# Patient Record
Sex: FEMALE | Race: WHITE | NOT HISPANIC OR LATINO | Employment: FULL TIME | URBAN - METROPOLITAN AREA
[De-identification: names, ages, dates, MRNs, and addresses within clinical notes are randomized per-mention and may not be internally consistent; named-entity substitution may affect disease eponyms.]

---

## 2017-03-20 ENCOUNTER — HOSPITAL ENCOUNTER (EMERGENCY)
Facility: CLINIC | Age: 37
Discharge: HOME | End: 2017-03-20
Attending: EMERGENCY MEDICINE | Admitting: EMERGENCY MEDICINE
Payer: MEDICAID

## 2017-03-20 ENCOUNTER — TELEPHONE (OUTPATIENT)
Dept: BEHAVIORAL HEALTH | Facility: CLINIC | Age: 37
End: 2017-03-20

## 2017-03-20 VITALS
HEART RATE: 100 BPM | OXYGEN SATURATION: 98 % | TEMPERATURE: 98 F | SYSTOLIC BLOOD PRESSURE: 136 MMHG | WEIGHT: 150.2 LBS | DIASTOLIC BLOOD PRESSURE: 90 MMHG | RESPIRATION RATE: 16 BRPM

## 2017-03-20 DIAGNOSIS — F10.220 ACUTE ALCOHOLIC INTOXICATION IN ALCOHOLISM, UNCOMPLICATED (H): ICD-10-CM

## 2017-03-20 LAB
ALBUMIN SERPL-MCNC: 3.8 G/DL (ref 3.4–5)
ALCOHOL BREATH TEST: 0.3 (ref 0–0.01)
ALP SERPL-CCNC: 81 U/L (ref 40–150)
ALT SERPL W P-5'-P-CCNC: 50 U/L (ref 0–50)
AMPHETAMINES UR QL SCN: ABNORMAL
ANION GAP SERPL CALCULATED.3IONS-SCNC: 12 MMOL/L (ref 3–14)
AST SERPL W P-5'-P-CCNC: 63 U/L (ref 0–45)
BARBITURATES UR QL: ABNORMAL
BASOPHILS # BLD AUTO: 0 10E9/L (ref 0–0.2)
BASOPHILS NFR BLD AUTO: 0.7 %
BENZODIAZ UR QL: ABNORMAL
BILIRUB SERPL-MCNC: 0.4 MG/DL (ref 0.2–1.3)
BUN SERPL-MCNC: 7 MG/DL (ref 7–30)
CALCIUM SERPL-MCNC: 8.5 MG/DL (ref 8.5–10.1)
CANNABINOIDS UR QL SCN: ABNORMAL
CHLORIDE SERPL-SCNC: 109 MMOL/L (ref 94–109)
CO2 SERPL-SCNC: 25 MMOL/L (ref 20–32)
COCAINE UR QL: ABNORMAL
CREAT SERPL-MCNC: 0.54 MG/DL (ref 0.52–1.04)
DIFFERENTIAL METHOD BLD: ABNORMAL
EOSINOPHIL # BLD AUTO: 0.1 10E9/L (ref 0–0.7)
EOSINOPHIL NFR BLD AUTO: 1.3 %
ERYTHROCYTE [DISTWIDTH] IN BLOOD BY AUTOMATED COUNT: 14.4 % (ref 10–15)
ETHANOL UR QL SCN: ABNORMAL
GFR SERPL CREATININE-BSD FRML MDRD: ABNORMAL ML/MIN/1.7M2
GLUCOSE SERPL-MCNC: 74 MG/DL (ref 70–99)
HCT VFR BLD AUTO: 41.1 % (ref 35–47)
HGB BLD-MCNC: 14.3 G/DL (ref 11.7–15.7)
IMM GRANULOCYTES # BLD: 0 10E9/L (ref 0–0.4)
IMM GRANULOCYTES NFR BLD: 0.2 %
LIPASE SERPL-CCNC: 160 U/L (ref 73–393)
LYMPHOCYTES # BLD AUTO: 2.7 10E9/L (ref 0.8–5.3)
LYMPHOCYTES NFR BLD AUTO: 45.1 %
MCH RBC QN AUTO: 34.8 PG (ref 26.5–33)
MCHC RBC AUTO-ENTMCNC: 34.8 G/DL (ref 31.5–36.5)
MCV RBC AUTO: 100 FL (ref 78–100)
MONOCYTES # BLD AUTO: 0.3 10E9/L (ref 0–1.3)
MONOCYTES NFR BLD AUTO: 5 %
NEUTROPHILS # BLD AUTO: 2.9 10E9/L (ref 1.6–8.3)
NEUTROPHILS NFR BLD AUTO: 47.7 %
NRBC # BLD AUTO: 0 10*3/UL
NRBC BLD AUTO-RTO: 0 /100
OPIATES UR QL SCN: ABNORMAL
PLATELET # BLD AUTO: 278 10E9/L (ref 150–450)
POTASSIUM SERPL-SCNC: 3.3 MMOL/L (ref 3.4–5.3)
PROT SERPL-MCNC: 6.9 G/DL (ref 6.8–8.8)
RBC # BLD AUTO: 4.11 10E12/L (ref 3.8–5.2)
SODIUM SERPL-SCNC: 146 MMOL/L (ref 133–144)
WBC # BLD AUTO: 6.1 10E9/L (ref 4–11)

## 2017-03-20 PROCEDURE — 99285 EMERGENCY DEPT VISIT HI MDM: CPT | Performed by: EMERGENCY MEDICINE

## 2017-03-20 PROCEDURE — 82075 ASSAY OF BREATH ETHANOL: CPT | Performed by: EMERGENCY MEDICINE

## 2017-03-20 PROCEDURE — 99284 EMERGENCY DEPT VISIT MOD MDM: CPT | Mod: Z6 | Performed by: EMERGENCY MEDICINE

## 2017-03-20 PROCEDURE — 80053 COMPREHEN METABOLIC PANEL: CPT | Performed by: EMERGENCY MEDICINE

## 2017-03-20 PROCEDURE — 83690 ASSAY OF LIPASE: CPT | Performed by: EMERGENCY MEDICINE

## 2017-03-20 PROCEDURE — 80320 DRUG SCREEN QUANTALCOHOLS: CPT | Mod: 91 | Performed by: EMERGENCY MEDICINE

## 2017-03-20 PROCEDURE — 85025 COMPLETE CBC W/AUTO DIFF WBC: CPT | Performed by: EMERGENCY MEDICINE

## 2017-03-20 PROCEDURE — 80307 DRUG TEST PRSMV CHEM ANLYZR: CPT | Performed by: EMERGENCY MEDICINE

## 2017-03-20 PROCEDURE — 81025 URINE PREGNANCY TEST: CPT | Performed by: EMERGENCY MEDICINE

## 2017-03-20 RX ORDER — LIDOCAINE 40 MG/G
CREAM TOPICAL
Status: DISCONTINUED | OUTPATIENT
Start: 2017-03-20 | End: 2017-03-20 | Stop reason: HOSPADM

## 2017-03-20 ASSESSMENT — ENCOUNTER SYMPTOMS
ABDOMINAL PAIN: 1
NEUROLOGICAL NEGATIVE: 1
VOMITING: 1

## 2017-03-20 NOTE — ED AVS SNAPSHOT
[facility], Emergency Department    [address]    [place] [address]    Phone:  [phone]    Fax:  [phone]                                       Gloria Benjamin   MRN: [id number]    Department:  [facility], Emergency Department   Date of Visit:  3/20/2017           Patient Information     Date Of Birth          1980        Your diagnoses for this visit were:     Acute alcoholic intoxication in alcoholism, uncomplicated (H)        You were seen by Desi Myers MD.        Discharge Instructions       Thank you for coming to the [facility] Emergency Department.     Please go directly to [facility] to start detox today.     Abstain from alcohol and marijuana.    If stomach discomfort returns, try over the counter ranitidine or prilosec antacids. Stopping alcohol use will help gastritis or peptic ulcers improve. Stopping marijuana use will prevent recurrent vomiting called cannabinoid hyperemesis syndrome.        24 Hour Appointment Hotline       To make an appointment at any [facility] clinic, call [phone] ([phone]). If you don't have a family doctor or clinic, we will help you find one. [facility] clinics are conveniently located to serve the needs of you and your family.             Review of your medicines      Our records show that you are taking the medicines listed below. If these are incorrect, please call your family doctor or clinic.        Dose / Directions Last dose taken    IMITREX PO        Take by mouth every 8 hours as needed for migraine   Refills:  0                Procedures and tests performed during your visit     Alcohol breath test POCT    CBC with platelets differential    Comprehensive metabolic panel    Drug abuse screen 6 urine (tox)    HCG qualitative urine    Lipase    Peripheral IV catheter      Orders Needing Specimen Collection     None      Pending Results     No orders found from 3/18/2017 to 3/21/2017.        "     Pending Culture Results     No orders found from 3/18/2017 to 3/21/2017.            Thank you for choosing [facility]       Thank you for choosing [facility] for your care. Our goal is always to provide you with excellent care. Hearing back from our patients is one way we can continue to improve our services. Please take a few minutes to complete the written survey that you may receive in the mail after you visit with us. Thank you!        [employer] Information     [employer] lets you send messages to your doctor, view your test results, renew your prescriptions, schedule appointments and more. To sign up, go to [email or web address] . Click on \"Log in\" on the left side of the screen, which will take you to the Welcome page. Then click on \"Sign up Now\" on the right side of the page.     You will be asked to enter the access code listed below, as well as some personal information. Please follow the directions to create your username and password.     Your access code is: [email or web address]  Expires: 2017  4:13 PM     Your access code will  in 90 days. If you need help or a new code, please call your [facility] clinic or [phone].        Care EveryWhere ID     This is your Care EveryWhere ID. This could be used by other organizations to access your [facility] medical records  [id number]        After Visit Summary       This is your record. Keep this with you and show to your community pharmacist(s) and doctor(s) at your next visit.                  "

## 2017-03-20 NOTE — ED AVS SNAPSHOT
[facility], [place], Emergency Department    [address]    [facility] [address]    Phone:  [phone]    Fax:  [phone]                                       Gloria Benjamin   MRN: [id number]    Department:  [facility], Emergency Department   Date of Visit:  3/20/2017           After Visit Summary Signature Page     I have received my discharge instructions, and my questions have been answered. I have discussed any challenges I see with this plan with the nurse or doctor.    ..........................................................................................................................................  Patient/Patient Representative Signature      ..........................................................................................................................................  Patient Representative Print Name and Relationship to Patient    ..................................................               ................................................  Date                                            Time    ..........................................................................................................................................  Reviewed by Signature/Title    ...................................................              ..............................................  Date                                                            Time

## 2017-03-20 NOTE — ED PROVIDER NOTES
History     Chief Complaint   Patient presents with     Addiction Problem     HPI  Gloria Benjamin is a 36-year-old female here seeking detox from alcohol. She reports a long history of alcohol use.  She started when she was a teenager and works as a .  She does drink daily and most recently has been taking approximately a half liter of Raymond whiskey daily, her last drink was approximately one hour prior to coming here to the hospital.  On arrival her breathalyzer is 0.298.  In addition to alcohol she does use marijuana daily.  She uses no other drugs.  She has never entered detox or done any other treatment programs in the past, but is currently working to enter chemical dependency treatment at the [facility] in [place] but is required to go through supervised detox prior. She does report a history of withdrawal symptoms of tremulousness, slight agitation when not drinking but no history of DTs or seizures.    She has a history of an allergy to penicillin and she s also had an appendectomy, otherwise she is healthy.  She has been seen in hospitals and emergency departments in the past several months with issues with nausea and vomiting, the thought was that she might have cannabinoid hyperemesis versus alcoholic gastritis or peptic ulcer disease.  She has noted blood in her emesis in the past but not recently. No melena.  She reports a past history of depression and has been through therapy, but is not currently working with a therapist or psychiatrist, and is not medicated.  No current suicidal ideation.  She does have a history of suicidal threats that were made when she was a 14-year-old child and was admitted to a mental health unit for approximately one week for treatment at that time.  She presents here with her mom who has been actively working over the weekend to try to find a detox bed for her.    This part of the medical record was transcribed by Milena May Medical Scribe, from a dictation  done by Desi Myers MD.     PAST MEDICAL HISTORY  Past Medical History   Diagnosis Date     Substance abuse      PAST SURGICAL HISTORY  Past Surgical History   Procedure Laterality Date     Appendectomy       Gyn surgery       Ovarian cyst removed     FAMILY HISTORY  No family history on file.  SOCIAL HISTORY  Social History   Substance Use Topics     Smoking status: Current Every Day Smoker     Packs/day: 1.00     Smokeless tobacco: Never Used     Alcohol use Yes      Comment: 1/2 liter day     MEDICATIONS  Current Facility-Administered Medications   Medication     lidocaine 1 % 1 mL     lidocaine (LMX4) kit     sodium chloride (PF) 0.9% PF flush 3 mL     sodium chloride (PF) 0.9% PF flush 3 mL     Current Outpatient Prescriptions   Medication     SUMAtriptan Succinate (IMITREX PO)     ALLERGIES  Allergies   Allergen Reactions     Penicillin G Hives       I have reviewed the Medications, Allergies, Past Medical and Surgical History, and Social History in the Epic system.    Review of Systems   Gastrointestinal: Positive for abdominal pain and vomiting.   Neurological: Negative.    All other systems reviewed and are negative.      Physical Exam   BP: (!) 130/94  Heart Rate: 78  Temp: 98  F (36.7  C)  Resp: 16  Weight: 68.1 kg (150 lb 3.2 oz)  SpO2: 98 %    Physical Exam  Gen:A&Ox3, no acute distress, intoxicated but cooperative.   HEENT:PERRL, no facial tenderness or wounds, head atraumatic, oropharynx clear, mucous membranes moist, TMs clear bilaterally  Neck:no bony tenderness or step offs, no JVD, trachea midline  Back: no CVA tenderness  CV:RRR without murmurs  PULM:Clear to auscultation bilaterally  Abd: soft, minimal epigastric tenderness, no guarding or rebound tenderness   UE:No traumatic injuries, skin normal  LE:no traumatic injuries, skin normal, no LE edema.   Neuro:CN II-XII intact, strength 5/5 throughout, gait stable.   Skin: no rashes or ecchymoses      ED Course     ED Course     Procedures              Critical Care time:  none               Labs Ordered and Resulted from Time of ED Arrival Up to the Time of Departure from the ED   CBC WITH PLATELETS DIFFERENTIAL - Abnormal; Notable for the following:        Result Value    MCH 34.8 (*)     All other components within normal limits   COMPREHENSIVE METABOLIC PANEL - Abnormal; Notable for the following:     Sodium 146 (*)     Potassium 3.3 (*)     AST 63 (*)     All other components within normal limits   DRUG ABUSE SCREEN 6 CHEM DEP URINE ([facility]) - Abnormal; Notable for the following:     Cannabinoids Qual Urine   (*)     Value: Positive   Cutoff for a positive cannabinoid is greater than 50 ng/mL. This is an   unconfirmed screening result to be used for medical purposes only.      Ethanol Qual Urine   (*)     Value: Positive   Cutoff for a positive urine ethanol is greater than 0.05 g/dL.  This is an   unconfirmed screening result to be used for medical purposes only.      All other components within normal limits   ALCOHOL BREATH TEST POCT - Abnormal; Notable for the following:     Alcohol Breath Test 0.298 (*)     All other components within normal limits   LIPASE   HCG QUALITATIVE URINE   PERIPHERAL IV CATHETER       Assessments & Plan (with Medical Decision Making)   36-year-old female presenting with alcoholism.  She is intoxicated with a breathalyzer of 0.298 but is awake, alert and able to communicate.  She is interested in detox. I was able to speak with our detox intake, they are not certain that we will be able to have a bed for her today.  Vitals here are within normal limits.  Laboratory testing was done including CBC, comprehensive metabolic panel and lipase, these were notable for a slight increase in sodium to 146, potassium of 3.3, and minimal elevation of AST at 63. Her case was discussed with nursing staff at [facility] and they will have a bed available for her after 4:00pm today. The patient and her Mom are agreeable to transfer there  but would prefer to go by private car.       bHCG negative.   USD + for cannabinoids and alcohol, no other substances, consistent with her reported history.     I have reviewed the nursing notes.    I have reviewed the findings, diagnosis, plan and need for follow up with the patient.    New Prescriptions    No medications on file       Final diagnoses:   Acute alcoholic intoxication in alcoholism, uncomplicated (H)       3/20/2017   [facility], [place], EMERGENCY DEPARTMENT    MD OLAF Duke Katrina Anne, MD  03/20/17 [id number]

## 2017-03-20 NOTE — DISCHARGE INSTRUCTIONS
Thank you for coming to the [facility] Emergency Department.     Please go directly to [facility] to start detox today.     Abstain from alcohol and marijuana.    If stomach discomfort returns, try over the counter ranitidine or prilosec antacids. Stopping alcohol use will help gastritis or peptic ulcers improve. Stopping marijuana use will prevent recurrent vomiting called cannabinoid hyperemesis syndrome.

## 2017-03-21 LAB — HCG UR QL: NEGATIVE

## 2017-05-31 ENCOUNTER — RESULT FOLLOW UP (OUTPATIENT)
Dept: OBGYN | Facility: CLINIC | Age: 37
End: 2017-05-31

## 2017-05-31 ENCOUNTER — OFFICE VISIT (OUTPATIENT)
Dept: OBGYN | Facility: CLINIC | Age: 37
End: 2017-05-31
Payer: COMMERCIAL

## 2017-05-31 VITALS
HEIGHT: 63 IN | SYSTOLIC BLOOD PRESSURE: 118 MMHG | HEART RATE: 89 BPM | BODY MASS INDEX: 27.64 KG/M2 | DIASTOLIC BLOOD PRESSURE: 74 MMHG | WEIGHT: 156 LBS | OXYGEN SATURATION: 99 %

## 2017-05-31 DIAGNOSIS — R87.619 ABNORMAL PAP SMEAR OF CERVIX: ICD-10-CM

## 2017-05-31 DIAGNOSIS — Z01.419 ENCOUNTER FOR GYNECOLOGICAL EXAMINATION WITHOUT ABNORMAL FINDING: Primary | ICD-10-CM

## 2017-05-31 PROCEDURE — 87624 HPV HI-RISK TYP POOLED RSLT: CPT | Performed by: OBSTETRICS & GYNECOLOGY

## 2017-05-31 PROCEDURE — 99385 PREV VISIT NEW AGE 18-39: CPT | Performed by: OBSTETRICS & GYNECOLOGY

## 2017-05-31 PROCEDURE — G0145 SCR C/V CYTO,THINLAYER,RESCR: HCPCS | Performed by: OBSTETRICS & GYNECOLOGY

## 2017-05-31 NOTE — LETTER
May 18, 2018      Gloria Benjamin  [address] [address]    Dear ,      This letter is to remind you that you are due for your follow up PAP smear and HPV test on or about 5/31/18.    Please call [phone] to schedule your appointment at your earliest convenience.     If you have completed the tests outside of [facility], please have the results forwarded to our office. We will update the chart for your primary Physician to review before your next annual physical.     Sincerely,      Chelsi Balbuena MD/[facility]

## 2017-05-31 NOTE — MR AVS SNAPSHOT
"              After Visit Summary   2017    Gloria Benjamin    MRN: [id number]           Patient Information     Date Of Birth          1980        Visit Information        Provider Department      2017 1:30 PM Chelsi Balbuena MD [facility]        Today's Diagnoses     Encounter for gynecological examination without abnormal finding    -  1       Follow-ups after your visit        Who to contact     If you have questions or need follow up information about today's clinic visit or your schedule please contact [facility]note 2] directly at [phone].  Normal or non-critical lab and imaging results will be communicated to you by [employer]ix[employer], letter or phone within 4 business days after the clinic has received the results. If you do not hear from us within 7 days, please contact the clinic through [employer] or phone. If you have a critical or abnormal lab result, we will notify you by phone as soon as possible.  Submit refill requests through S[employer]r or call your pharmacy and they will forward the refill request to us. Please allow 3 business days for your refill to be completed.          Additional Information About Your Visit        MyChart Information     [employer] lets you send messages to your doctor, view your test results, renew your prescriptions, schedule appointments and more. To sign up, go to [email or web address] . Click on \"Log in\" on the left side of the screen, which will take you to the Welcome page. Then click on \"Sign up Now\" on the right side of the page.     You will be asked to enter the access code listed below, as well as some personal information. Please follow the directions to create your username and password.     Your access code is: [email or web address]  Expires: 2017  4:13 PM     Your access code will  in 90 days. If you need help or a new code, please call your [facility] or [phone].        Care EveryWhere ID     This is your Care EveryWhere " "ID. This could be used by other organizations to access your [facility] medical records  [id number]        Your Vitals Were     Pulse Height Last Period Pulse Oximetry Breastfeeding? BMI (Body Mass Index)    89 5' 3\" (1.6 m) 05/27/2017 99% No 27.63 kg/m2       Blood Pressure from Last 3 Encounters:   05/31/17 118/74   03/20/17 136/90    Weight from Last 3 Encounters:   05/31/17 156 lb (70.8 kg)   03/20/17 150 lb 3.2 oz (68.1 kg)              We Performed the Following     HPV High Risk Types DNA Cervical     Pap imaged thin layer screen with HPV - recommended age 30 - 65 years (select HPV order below)        Primary Care Provider    Physician No Ref-Primary       No address on file        Thank you!     Thank you for choosing [facility]  for your care. Our goal is always to provide you with excellent care. Hearing back from our patients is one way we can continue to improve our services. Please take a few minutes to complete the written survey that you may receive in the mail after your visit with us. Thank you!             Your Updated Medication List - Protect others around you: Learn how to safely use, store and throw away your medicines at www.disposemymeds.org.          This list is accurate as of: 5/31/17  2:10 PM.  Always use your most recent med list.                   Brand Name Dispense Instructions for use    IMITREX PO      Take by mouth every 8 hours as needed for migraine         "

## 2017-05-31 NOTE — NURSING NOTE
"Chief Complaint   Patient presents with     Physical       Initial /74  Pulse 89  Ht 5' 3\" (1.6 m)  Wt 156 lb (70.8 kg)  LMP 2017  SpO2 99%  Breastfeeding? No  BMI 27.63 kg/m2 Estimated body mass index is 27.63 kg/(m^2) as calculated from the following:    Height as of this encounter: 5' 3\" (1.6 m).    Weight as of this encounter: 156 lb (70.8 kg).  BP completed using cuff size: regular        The following HM Due: Pap smear    The following patient reported/Care Every where data was sent to:  P ABSTRACT QUALITY INITIATIVES [45720]  none     patient has appointment for today             "

## 2017-05-31 NOTE — PROGRESS NOTES
Gloria is a 36 year old  female who presents for annual exam.   Had laparotomy with right ovarian cystectomy about 5 years ago, reports extensive endometriosis found, used Lupron x one year (found it a very unpleasant medication, would not be interested in any hormonal options).  Has regular menses, heavy and painful, but feels she can deal with the situation at this time.  Last pap several years ago, had an abnormal pap in her teens.  Will send pap/HPV test today, if both are negative will plan to co-test in one year.  Declines STD testing.  Recently hospitalized for alcohol abuse.  Smokes cigarettes, not a candidate for estrogen-containing contraception.      Menses are irregular and normal and painful lasting 4-7 days.  Menses flow: heavy.  Patient's last menstrual period was 2017.. Using none for contraception.  She is not currently considering pregnancy.  Besides routine health maintenance, she has no other health concerns today .  GYNECOLOGIC HISTORY:  Menarche: 11    Gloria is sexually active with 1male partner(s) and is currently in monogamous relationship with boyfriend.    History sexually transmitted infections:No STD history  STI testing offered?  Declined  CHITRA exposure: No  History of abnormal Pap smear: YES - updated in Problem List and Health Maintenance accordingly  Family history of breast CA: No  Family history of uterine/ovarian CA: No    Family history of colon CA: No    HEALTH MAINTENANCE:  Cholesterol: (No results found for: CHOL History of abnormal lipids: No  Mammo: 0 . History of abnormal Mammo: Not applicable.  Regular Self Breast Exams: No  Calcium/Vitamin D intake: source:  dairy, dietary supplement(s) Adequate? Yes  TSH: (No results found for: TSH )  Pap; (No results found for: PAP )    HISTORY:  Obstetric History       T0      L0     SAB0   TAB0   Ectopic0   Multiple0   Live Births0       # Outcome Date GA Lbr Foreign/2nd Weight Sex Delivery Anes PTL Lv   2 AB      "       1 AB                 Past Medical History:   Diagnosis Date     Substance abuse      Past Surgical History:   Procedure Laterality Date     APPENDECTOMY       GYN SURGERY      Ovarian cyst removed     No family history on file.  Social History     Social History     Marital status: Single     Spouse name: N/A     Number of children: N/A     Years of education: N/A     Social History Main Topics     Smoking status: Current Every Day Smoker     Packs/day: 1.00     Smokeless tobacco: Never Used     Alcohol use No     Drug use: No      Comment: Occasional     Sexual activity: Yes     Partners: Male     Birth control/ protection: Condom     Other Topics Concern     None     Social History Narrative       Current Outpatient Prescriptions:      SUMAtriptan Succinate (IMITREX PO), Take by mouth every 8 hours as needed for migraine, Disp: , Rfl:      Allergies   Allergen Reactions     Penicillin G Hives       Past medical, surgical, social and family history were reviewed and updated in EPIC.    ROS:   C:     NEGATIVE for fever, chills, change in weight  I:       NEGATIVE for worrisome rashes, moles or lesions  E:     NEGATIVE for vision changes or irritation  E/M: NEGATIVE for ear, mouth and throat problems  R:     NEGATIVE for significant cough or SOB  CV:   NEGATIVE for chest pain, palpitations or peripheral edema  GI:     NEGATIVE for nausea, abdominal pain, heartburn, or change in bowel habits  :   NEGATIVE for frequency, dysuria, hematuria, vaginal discharge, or irregular bleeding  M:     NEGATIVE for significant arthralgias or myalgia  N:      NEGATIVE for weakness, dizziness or paresthesias  E:      NEGATIVE for temperature intolerance, skin/hair changes  P:      NEGATIVE for changes in mood or affect.    EXAM:  /74  Pulse 89  Ht 5' 3\" (1.6 m)  Wt 156 lb (70.8 kg)  LMP 05/27/2017  SpO2 99%  Breastfeeding? No  BMI 27.63 kg/m2   BMI: Body mass index is 27.63 kg/(m^2).  Constitutional: healthy, " alert and no distress  Head: Normocephalic. No masses, lesions, tenderness or abnormalities  Neck: Neck supple. Trachea midline. No adenopathy. Thyroid symmetric, normal size.   Cardiovascular: RRR.   Respiratory: Negative.   Breast: No nodularity, asymmetry or nipple discharge bilaterally.  Gastrointestinal: Abdomen soft, non-tender, non-distended. No masses, organomegaly.  :  Vulva:  No external lesions, normal female hair distribution, no inguinal adenopathy.    Urethra:  Midline, non-tender, well supported, no discharge  Vagina:  Moist, pink, no abnormal discharge, no lesions  Uterus:  Normal size, non-tender, freely mobile  Ovaries:  No masses appreciated, non-tender, mobile  Rectal Exam: deferred  Musculoskeletal: extremities normal  Skin: no suspicious lesions or rashes  Psychiatric: Affect appropriate, cooperative,mentation appears normal.     COUNSELING:      reports that she has been smoking.  She has been smoking about 1.00 pack per day. She has never used smokeless tobacco.  Tobacco Cessation Action Plan: Information offered: Patient not interested at this time  Body mass index is 27.63 kg/(m^2).  Weight management plan: diet and exercise  FRAX Risk Assessment    ASSESSMENT:  36 year old female with satisfactory annual exam  (Z01.419) Encounter for gynecological examination without abnormal finding  (primary encounter diagnosis)  Comment:   Plan: Pap imaged thin layer screen with HPV -         recommended age 30 - 65 years (select HPV order        below), HPV High Risk Types DNA Cervical

## 2017-05-31 NOTE — LETTER
June 8, 2017    Gloria Benjamin  [address]  [facility] [place] [facility] [address]    Dear Gloria,  We are happy to inform you that your PAP smear result from 05/31/17 is normal.  We are now able to do a follow up test on PAP smears. The DNA test is for HPV (Human Papilloma Virus). Cervical cancer is closely linked with certain types of HPV. Your result showed no evidence of high risk HPV.  Therefore we recommend you return in 1 year for your next pap smear and HPV test.  You will also need to return to the clinic every year for an annual exam and other preventive tests.  Please contact the clinic at [phone] with any questions.  Sincerely,    Chelsi Balbuena MD/[facility]

## 2017-06-02 LAB
COPATH REPORT: NORMAL
PAP: NORMAL

## 2017-06-05 LAB
FINAL DIAGNOSIS: NORMAL
HPV HR 12 DNA CVX QL NAA+PROBE: NEGATIVE
HPV16 DNA SPEC QL NAA+PROBE: NEGATIVE
HPV18 DNA SPEC QL NAA+PROBE: NEGATIVE
SPECIMEN DESCRIPTION: NORMAL

## 2017-06-07 NOTE — PROGRESS NOTES
Pt reported, abnormal pap in late teens, results unknown.  05/31/17: NIL pap, Neg HR HPV result. Plan cotest in 1 year per provider. Results letter sent to the pt with the results and recommendations.  06/08/17 Result letter printed and sent at request of RN. ([facility])   5/18/18 Cotest reminder letter sent ([facility])  12/05/18: NIL pap, Neg HR HPV result. Plan: co-test in 3 years. ([facility])

## 2017-10-24 ENCOUNTER — OFFICE VISIT (OUTPATIENT)
Dept: FAMILY MEDICINE | Facility: CLINIC | Age: 37
End: 2017-10-24
Payer: COMMERCIAL

## 2017-10-24 ENCOUNTER — RADIANT APPOINTMENT (OUTPATIENT)
Dept: GENERAL RADIOLOGY | Facility: CLINIC | Age: 37
End: 2017-10-24
Attending: FAMILY MEDICINE
Payer: COMMERCIAL

## 2017-10-24 VITALS
OXYGEN SATURATION: 99 % | BODY MASS INDEX: 28.87 KG/M2 | WEIGHT: 163 LBS | HEART RATE: 70 BPM | DIASTOLIC BLOOD PRESSURE: 77 MMHG | TEMPERATURE: 98.9 F | RESPIRATION RATE: 16 BRPM | SYSTOLIC BLOOD PRESSURE: 118 MMHG

## 2017-10-24 DIAGNOSIS — Z71.6 ENCOUNTER FOR SMOKING CESSATION COUNSELING: ICD-10-CM

## 2017-10-24 DIAGNOSIS — Z23 NEED FOR PROPHYLACTIC VACCINATION AND INOCULATION AGAINST INFLUENZA: ICD-10-CM

## 2017-10-24 DIAGNOSIS — F10.21 ALCOHOL USE DISORDER, MODERATE, IN EARLY REMISSION (H): ICD-10-CM

## 2017-10-24 DIAGNOSIS — M54.9 BACK PAIN, UNSPECIFIED BACK LOCATION, UNSPECIFIED BACK PAIN LATERALITY, UNSPECIFIED CHRONICITY: Primary | ICD-10-CM

## 2017-10-24 DIAGNOSIS — Z23 NEED FOR TDAP VACCINATION: ICD-10-CM

## 2017-10-24 DIAGNOSIS — R35.0 URINARY FREQUENCY: ICD-10-CM

## 2017-10-24 DIAGNOSIS — M54.2 CERVICALGIA: ICD-10-CM

## 2017-10-24 DIAGNOSIS — M54.9 BACK PAIN, UNSPECIFIED BACK LOCATION, UNSPECIFIED BACK PAIN LATERALITY, UNSPECIFIED CHRONICITY: ICD-10-CM

## 2017-10-24 LAB
ALBUMIN UR-MCNC: NEGATIVE MG/DL
APPEARANCE UR: CLEAR
BASOPHILS # BLD AUTO: 0 10E9/L (ref 0–0.2)
BASOPHILS NFR BLD AUTO: 0.3 %
BILIRUB UR QL STRIP: NEGATIVE
COLOR UR AUTO: YELLOW
CRP SERPL-MCNC: <2.9 MG/L (ref 0–8)
DIFFERENTIAL METHOD BLD: NORMAL
EOSINOPHIL # BLD AUTO: 0.1 10E9/L (ref 0–0.7)
EOSINOPHIL NFR BLD AUTO: 1.2 %
ERYTHROCYTE [DISTWIDTH] IN BLOOD BY AUTOMATED COUNT: 13.9 % (ref 10–15)
ERYTHROCYTE [SEDIMENTATION RATE] IN BLOOD BY WESTERGREN METHOD: 7 MM/H (ref 0–20)
GLUCOSE UR STRIP-MCNC: NEGATIVE MG/DL
HBA1C MFR BLD: 5.3 % (ref 4.3–6)
HCT VFR BLD AUTO: 41.6 % (ref 35–47)
HGB BLD-MCNC: 14.1 G/DL (ref 11.7–15.7)
HGB UR QL STRIP: NEGATIVE
KETONES UR STRIP-MCNC: NEGATIVE MG/DL
LEUKOCYTE ESTERASE UR QL STRIP: NEGATIVE
LYMPHOCYTES # BLD AUTO: 2.4 10E9/L (ref 0.8–5.3)
LYMPHOCYTES NFR BLD AUTO: 30.3 %
MCH RBC QN AUTO: 31.3 PG (ref 26.5–33)
MCHC RBC AUTO-ENTMCNC: 33.9 G/DL (ref 31.5–36.5)
MCV RBC AUTO: 92 FL (ref 78–100)
MONOCYTES # BLD AUTO: 0.5 10E9/L (ref 0–1.3)
MONOCYTES NFR BLD AUTO: 6.9 %
NEUTROPHILS # BLD AUTO: 4.8 10E9/L (ref 1.6–8.3)
NEUTROPHILS NFR BLD AUTO: 61.3 %
NITRATE UR QL: NEGATIVE
PH UR STRIP: 6 PH (ref 5–7)
PLATELET # BLD AUTO: 281 10E9/L (ref 150–450)
RBC # BLD AUTO: 4.5 10E12/L (ref 3.8–5.2)
SOURCE: NORMAL
SP GR UR STRIP: >1.03 (ref 1–1.03)
UROBILINOGEN UR STRIP-ACNC: 0.2 EU/DL (ref 0.2–1)
WBC # BLD AUTO: 7.8 10E9/L (ref 4–11)

## 2017-10-24 PROCEDURE — 90715 TDAP VACCINE 7 YRS/> IM: CPT | Performed by: FAMILY MEDICINE

## 2017-10-24 PROCEDURE — 90686 IIV4 VACC NO PRSV 0.5 ML IM: CPT | Performed by: FAMILY MEDICINE

## 2017-10-24 PROCEDURE — 99204 OFFICE O/P NEW MOD 45 MIN: CPT | Mod: 25 | Performed by: FAMILY MEDICINE

## 2017-10-24 PROCEDURE — 80050 GENERAL HEALTH PANEL: CPT | Performed by: FAMILY MEDICINE

## 2017-10-24 PROCEDURE — 90471 IMMUNIZATION ADMIN: CPT | Performed by: FAMILY MEDICINE

## 2017-10-24 PROCEDURE — 83036 HEMOGLOBIN GLYCOSYLATED A1C: CPT | Performed by: FAMILY MEDICINE

## 2017-10-24 PROCEDURE — 72100 X-RAY EXAM L-S SPINE 2/3 VWS: CPT

## 2017-10-24 PROCEDURE — 90472 IMMUNIZATION ADMIN EACH ADD: CPT | Performed by: FAMILY MEDICINE

## 2017-10-24 PROCEDURE — 86140 C-REACTIVE PROTEIN: CPT | Performed by: FAMILY MEDICINE

## 2017-10-24 PROCEDURE — 81003 URINALYSIS AUTO W/O SCOPE: CPT | Performed by: FAMILY MEDICINE

## 2017-10-24 PROCEDURE — 86431 RHEUMATOID FACTOR QUANT: CPT | Performed by: FAMILY MEDICINE

## 2017-10-24 PROCEDURE — 85652 RBC SED RATE AUTOMATED: CPT | Performed by: FAMILY MEDICINE

## 2017-10-24 PROCEDURE — 36415 COLL VENOUS BLD VENIPUNCTURE: CPT | Performed by: FAMILY MEDICINE

## 2017-10-24 RX ORDER — CYCLOBENZAPRINE HCL 5 MG
5 TABLET ORAL 3 TIMES DAILY
Qty: 42 TABLET | Refills: 0 | Status: SHIPPED | OUTPATIENT
Start: 2017-10-24 | End: 2017-11-07

## 2017-10-24 RX ORDER — NICOTINE 21 MG/24HR
1 PATCH, TRANSDERMAL 24 HOURS TRANSDERMAL EVERY 24 HOURS
Qty: 30 PATCH | Refills: 0 | Status: SHIPPED | OUTPATIENT
Start: 2017-10-24 | End: 2018-12-05

## 2017-10-24 RX ORDER — CYCLOSPORINE 0.5 MG/ML
EMULSION OPHTHALMIC
Refills: 2 | COMMUNITY
Start: 2017-06-11 | End: 2018-12-05

## 2017-10-24 RX ORDER — NAPROXEN 500 MG/1
500 TABLET ORAL 2 TIMES DAILY WITH MEALS
Qty: 10 TABLET | Refills: 0 | Status: SHIPPED | OUTPATIENT
Start: 2017-10-24 | End: 2017-10-29

## 2017-10-24 NOTE — LETTER
October 25, 2017      Gloria Benjamin  [address] [address]        Dear ,    We are writing to inform you of your test results.    Results within acceptable limits.  Normal xray back bones & spaces    Resulted Orders   XR Lumbar Spine 2/3 Views    Narrative    LUMBAR SPINE THREE VIEWS  10/24/2017 3:49 PM     HISTORY: Back pain.    COMPARISON: None.      Impression    IMPRESSION:  Normal.     CHAZ GALLARDO MD       If you have any questions or concerns, please call the clinic at the number listed above.       Sincerely,    Carli Jesus MD/nr

## 2017-10-24 NOTE — NURSING NOTE
Screening Questionnaire for Adult Immunization    Prior to injection verified patient identity using patient's name and date of birth.      Are you sick today?   No   Do you have allergies to medications, food, a vaccine component or latex?   No   Have you ever had a serious reaction after receiving a vaccination?   No   Do you have a long-term health problem with heart disease, lung disease, asthma, kidney disease, metabolic disease (e.g. diabetes), anemia, or other blood disorder?   No   Do you have cancer, leukemia, HIV/AIDS, or any other immune system problem?   No   In the past 3 months, have you taken medications that affect  your immune system, such as prednisone, other steroids, or anticancer drugs; drugs for the treatment of rheumatoid arthritis, Crohn s disease, or psoriasis; or have you had radiation treatments?   No   Have you had a seizure, or a brain or other nervous system problem?   No   During the past year, have you received a transfusion of blood or blood     products, or been given immune (gamma) globulin or antiviral drug?   No   For women: Are you pregnant or is there a chance you could become        pregnant during the next month?   No   Have you received any vaccinations in the past 4 weeks?   No     Immunization questionnaire answers were all negative.        Per orders of Dr. Jesus, injection of Tdap and Flu given by Andres Moya. Patient instructed to remain in clinic for 15 minutes afterwards, and to report any adverse reaction to me immediately.    Per orders of Dr. Jesus, injection of Tdap and Flu given by Andres Moya. Patient instructed to remain in clinic for 15 minutes afterwards, and to report any adverse reaction to me immediately.     Screening performed by Andres Moya on 10/24/2017 at 3:12 PM.

## 2017-10-24 NOTE — PATIENT INSTRUCTIONS
Xray back today     Labs today including urine     Continue with being active  and modify activity that causes more pain   Ice then heat 20 min twice a day  Naproxen 500 mg twice a day for 5 days with food  Followed by ibuprofen 600 mg with food three times a day 5 days   Tylenol as needed  Over the counter pain creams to area  Flexeril 5 mg three times a day # 42 no refills  Can make you tired, do not drive or operate machinery on this medication   Therapy and ortho consult for further evaluation and treatment  Follow up with primary if symptoms persist  If get worse such that loose control of bladder , bowels or difficulty walking go to Urgent care/ ER     Flu and Tdap today

## 2017-10-24 NOTE — LETTER
October 25, 2017      Gloria Benjamin  [address] [facility] [address]        Dear ,    We are writing to inform you of your test results.    Results within acceptable limits. Normal sed rate makes it more like a muscle back problems but await rest of test    Resulted Orders   CBC with platelets differential   Result Value Ref Range    WBC 7.8 4.0 - 11.0 10e9/L    RBC Count 4.50 3.8 - 5.2 10e12/L    Hemoglobin 14.1 11.7 - 15.7 g/dL    Hematocrit 41.6 35.0 - 47.0 %    MCV 92 78 - 100 fl    MCH 31.3 26.5 - 33.0 pg    MCHC 33.9 31.5 - 36.5 g/dL    RDW 13.9 10.0 - 15.0 %    Platelet Count 281 150 - 450 10e9/L    Diff Method Automated Method     % Neutrophils 61.3 %    % Lymphocytes 30.3 %    % Monocytes 6.9 %    % Eosinophils 1.2 %    % Basophils 0.3 %    Absolute Neutrophil 4.8 1.6 - 8.3 10e9/L    Absolute Lymphocytes 2.4 0.8 - 5.3 10e9/L    Absolute Monocytes 0.5 0.0 - 1.3 10e9/L    Absolute Eosinophils 0.1 0.0 - 0.7 10e9/L    Absolute Basophils 0.0 0.0 - 0.2 10e9/L   UA reflex to Microscopic and Culture   Result Value Ref Range    Color Urine Yellow     Appearance Urine Clear     Glucose Urine Negative NEG^Negative mg/dL    Bilirubin Urine Negative NEG^Negative    Ketones Urine Negative NEG^Negative mg/dL    Specific Gravity Urine >1.030 1.003 - 1.035    Blood Urine Negative NEG^Negative    pH Urine 6.0 5.0 - 7.0 pH    Protein Albumin Urine Negative NEG^Negative mg/dL    Urobilinogen Urine 0.2 0.2 - 1.0 EU/dL    Nitrite Urine Negative NEG^Negative    Leukocyte Esterase Urine Negative NEG^Negative    Source Midstream Urine    ESR: Erythrocyte sedimentation rate   Result Value Ref Range    Sed Rate 7 0 - 20 mm/h   CRP, inflammation   Result Value Ref Range    CRP Inflammation <2.9 0.0 - 8.0 mg/L   Hemoglobin A1c   Result Value Ref Range    Hemoglobin A1C 5.3 4.3 - 6.0 %       If you have any questions or concerns, please call the clinic at the number listed above.       Sincerely,        Carli Jesus,  MD/nr

## 2017-10-24 NOTE — LETTER
October 25, 2017      Gloria Benjamin  [address] [facility] [address]        Dear ,    We are writing to inform you of your test results.    Results within acceptable limits.  -Liver and gallbladder tests are normal. (ALT,AST, Alk phos, bilirubin), kidney function is normal (Cr, GFR), Sodium is normal, Potassium is normal, Calcium is normal, Glucose is normal (diabetes screening test).   -TSH (thyroid stimulating hormone) level is normal which indicates normal thyroid function..    Resulted Orders   CBC with platelets differential   Result Value Ref Range    WBC 7.8 4.0 - 11.0 10e9/L    RBC Count 4.50 3.8 - 5.2 10e12/L    Hemoglobin 14.1 11.7 - 15.7 g/dL    Hematocrit 41.6 35.0 - 47.0 %    MCV 92 78 - 100 fl    MCH 31.3 26.5 - 33.0 pg    MCHC 33.9 31.5 - 36.5 g/dL    RDW 13.9 10.0 - 15.0 %    Platelet Count 281 150 - 450 10e9/L    Diff Method Automated Method     % Neutrophils 61.3 %    % Lymphocytes 30.3 %    % Monocytes 6.9 %    % Eosinophils 1.2 %    % Basophils 0.3 %    Absolute Neutrophil 4.8 1.6 - 8.3 10e9/L    Absolute Lymphocytes 2.4 0.8 - 5.3 10e9/L    Absolute Monocytes 0.5 0.0 - 1.3 10e9/L    Absolute Eosinophils 0.1 0.0 - 0.7 10e9/L    Absolute Basophils 0.0 0.0 - 0.2 10e9/L   Comprehensive metabolic panel   Result Value Ref Range    Sodium 139 133 - 144 mmol/L    Potassium 4.2 3.4 - 5.3 mmol/L    Chloride 108 94 - 109 mmol/L    Carbon Dioxide 25 20 - 32 mmol/L    Anion Gap 6 3 - 14 mmol/L    Glucose 83 70 - 99 mg/dL      Comment:      Non Fasting    Urea Nitrogen 13 7 - 30 mg/dL    Creatinine 0.68 0.52 - 1.04 mg/dL    GFR Estimate >90 >60 mL/min/1.7m2      Comment:      Non  GFR Calc    GFR Estimate If Black >90 >60 mL/min/1.7m2      Comment:       GFR Calc    Calcium 8.9 8.5 - 10.1 mg/dL    Bilirubin Total 0.3 0.2 - 1.3 mg/dL    Albumin 3.9 3.4 - 5.0 g/dL    Protein Total 7.0 6.8 - 8.8 g/dL    Alkaline Phosphatase 73 40 - 150 U/L    ALT 15 0 - 50 U/L     AST 4 0 - 45 U/L   TSH with free T4 reflex   Result Value Ref Range    TSH 2.11 0.40 - 4.00 mU/L   UA reflex to Microscopic and Culture   Result Value Ref Range    Color Urine Yellow     Appearance Urine Clear     Glucose Urine Negative NEG^Negative mg/dL    Bilirubin Urine Negative NEG^Negative    Ketones Urine Negative NEG^Negative mg/dL    Specific Gravity Urine >1.030 1.003 - 1.035    Blood Urine Negative NEG^Negative    pH Urine 6.0 5.0 - 7.0 pH    Protein Albumin Urine Negative NEG^Negative mg/dL    Urobilinogen Urine 0.2 0.2 - 1.0 EU/dL    Nitrite Urine Negative NEG^Negative    Leukocyte Esterase Urine Negative NEG^Negative    Source Midstream Urine    ESR: Erythrocyte sedimentation rate   Result Value Ref Range    Sed Rate 7 0 - 20 mm/h   CRP, inflammation   Result Value Ref Range    CRP Inflammation <2.9 0.0 - 8.0 mg/L   Hemoglobin A1c   Result Value Ref Range    Hemoglobin A1C 5.3 4.3 - 6.0 %   If you have any questions or concerns, please call the clinic at the number listed above.   Sincerely,  Carli Jesus MD//nr

## 2017-10-24 NOTE — LETTER
October 26, 2017      Gloria Benjamin  [address] [facility] [address]      Dear ,    We are writing to inform you of your test results.    Results within acceptable limits.  Negative for rhuematoid arthritis  Resulted Orders   CBC with platelets differential   Result Value Ref Range    WBC 7.8 4.0 - 11.0 10e9/L    RBC Count 4.50 3.8 - 5.2 10e12/L    Hemoglobin 14.1 11.7 - 15.7 g/dL    Hematocrit 41.6 35.0 - 47.0 %    MCV 92 78 - 100 fl    MCH 31.3 26.5 - 33.0 pg    MCHC 33.9 31.5 - 36.5 g/dL    RDW 13.9 10.0 - 15.0 %    Platelet Count 281 150 - 450 10e9/L    Diff Method Automated Method     % Neutrophils 61.3 %    % Lymphocytes 30.3 %    % Monocytes 6.9 %    % Eosinophils 1.2 %    % Basophils 0.3 %    Absolute Neutrophil 4.8 1.6 - 8.3 10e9/L    Absolute Lymphocytes 2.4 0.8 - 5.3 10e9/L    Absolute Monocytes 0.5 0.0 - 1.3 10e9/L    Absolute Eosinophils 0.1 0.0 - 0.7 10e9/L    Absolute Basophils 0.0 0.0 - 0.2 10e9/L   Comprehensive metabolic panel   Result Value Ref Range    Sodium 139 133 - 144 mmol/L    Potassium 4.2 3.4 - 5.3 mmol/L    Chloride 108 94 - 109 mmol/L    Carbon Dioxide 25 20 - 32 mmol/L    Anion Gap 6 3 - 14 mmol/L    Glucose 83 70 - 99 mg/dL      Comment:      Non Fasting    Urea Nitrogen 13 7 - 30 mg/dL    Creatinine 0.68 0.52 - 1.04 mg/dL    GFR Estimate >90 >60 mL/min/1.7m2      Comment:      Non  GFR Calc    GFR Estimate If Black >90 >60 mL/min/1.7m2      Comment:       GFR Calc    Calcium 8.9 8.5 - 10.1 mg/dL    Bilirubin Total 0.3 0.2 - 1.3 mg/dL    Albumin 3.9 3.4 - 5.0 g/dL    Protein Total 7.0 6.8 - 8.8 g/dL    Alkaline Phosphatase 73 40 - 150 U/L    ALT 15 0 - 50 U/L    AST 4 0 - 45 U/L   TSH with free T4 reflex   Result Value Ref Range    TSH 2.11 0.40 - 4.00 mU/L   UA reflex to Microscopic and Culture   Result Value Ref Range    Color Urine Yellow     Appearance Urine Clear     Glucose Urine Negative NEG^Negative mg/dL    Bilirubin Urine  Negative NEG^Negative    Ketones Urine Negative NEG^Negative mg/dL    Specific Gravity Urine >1.030 1.003 - 1.035    Blood Urine Negative NEG^Negative    pH Urine 6.0 5.0 - 7.0 pH    Protein Albumin Urine Negative NEG^Negative mg/dL    Urobilinogen Urine 0.2 0.2 - 1.0 EU/dL    Nitrite Urine Negative NEG^Negative    Leukocyte Esterase Urine Negative NEG^Negative    Source Midstream Urine    ESR: Erythrocyte sedimentation rate   Result Value Ref Range    Sed Rate 7 0 - 20 mm/h   CRP, inflammation   Result Value Ref Range    CRP Inflammation <2.9 0.0 - 8.0 mg/L   Rheumatoid factor   Result Value Ref Range    Rheumatoid Factor <20 <20 IU/mL   Hemoglobin A1c   Result Value Ref Range    Hemoglobin A1C 5.3 4.3 - 6.0 %   If you have any questions or concerns, please call the clinic at the number listed above.   Sincerely,  Carli Jesus MD/nr
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
## 2017-10-24 NOTE — PROGRESS NOTES
Results within acceptable limits. Normal sed rate makes it more like a muscle back problems but await rest of test

## 2017-10-24 NOTE — NURSING NOTE
"Chief Complaint   Patient presents with     Musculoskeletal Problem     back pain       Initial /77 (BP Location: Left arm, Patient Position: Sitting, Cuff Size: Adult Large)  Pulse 70  Temp 98.9  F (37.2  C) (Oral)  Resp 16  Wt 163 lb (73.9 kg)  SpO2 99%  BMI 28.87 kg/m2 Estimated body mass index is 28.87 kg/(m^2) as calculated from the following:    Height as of 5/31/17: 5' 3\" (1.6 m).    Weight as of this encounter: 163 lb (73.9 kg).  Medication Reconciliation: complete     Andres Moya MA      "

## 2017-10-24 NOTE — LETTER
October 25, 2017      Gloria Benjamin  [address] [facility] [address]        Dear ,    We are writing to inform you of your test results.    Results within acceptable limits.  -Normal red blood cell (hgb) levels, normal white blood cell count and normal platelet levels.  -A1C (diabetic test) is normal and indicates that your blood sugar has been in a normal range the last 3 months.  -Urine is normal..    Resulted Orders   CBC with platelets differential   Result Value Ref Range    WBC 7.8 4.0 - 11.0 10e9/L    RBC Count 4.50 3.8 - 5.2 10e12/L    Hemoglobin 14.1 11.7 - 15.7 g/dL    Hematocrit 41.6 35.0 - 47.0 %    MCV 92 78 - 100 fl    MCH 31.3 26.5 - 33.0 pg    MCHC 33.9 31.5 - 36.5 g/dL    RDW 13.9 10.0 - 15.0 %    Platelet Count 281 150 - 450 10e9/L    Diff Method Automated Method     % Neutrophils 61.3 %    % Lymphocytes 30.3 %    % Monocytes 6.9 %    % Eosinophils 1.2 %    % Basophils 0.3 %    Absolute Neutrophil 4.8 1.6 - 8.3 10e9/L    Absolute Lymphocytes 2.4 0.8 - 5.3 10e9/L    Absolute Monocytes 0.5 0.0 - 1.3 10e9/L    Absolute Eosinophils 0.1 0.0 - 0.7 10e9/L    Absolute Basophils 0.0 0.0 - 0.2 10e9/L   UA reflex to Microscopic and Culture   Result Value Ref Range    Color Urine Yellow     Appearance Urine Clear     Glucose Urine Negative NEG^Negative mg/dL    Bilirubin Urine Negative NEG^Negative    Ketones Urine Negative NEG^Negative mg/dL    Specific Gravity Urine >1.030 1.003 - 1.035    Blood Urine Negative NEG^Negative    pH Urine 6.0 5.0 - 7.0 pH    Protein Albumin Urine Negative NEG^Negative mg/dL    Urobilinogen Urine 0.2 0.2 - 1.0 EU/dL    Nitrite Urine Negative NEG^Negative    Leukocyte Esterase Urine Negative NEG^Negative    Source Midstream Urine    ESR: Erythrocyte sedimentation rate   Result Value Ref Range    Sed Rate 7 0 - 20 mm/h   CRP, inflammation   Result Value Ref Range    CRP Inflammation <2.9 0.0 - 8.0 mg/L   Hemoglobin A1c   Result Value Ref Range    Hemoglobin A1C 5.3  4.3 - 6.0 %       If you have any questions or concerns, please call the clinic at the number listed above.       Sincerely,        Carli Jesus MD/nr

## 2017-10-24 NOTE — PROGRESS NOTES
Results within acceptable limits.  -Normal red blood cell (hgb) levels, normal white blood cell count and normal platelet levels.  -A1C (diabetic test) is normal and indicates that your blood sugar has been in a normal range the last 3 months.  -Urine is normal..

## 2017-10-24 NOTE — LETTER
October 25, 2017      Gloria Benjamin  [address] [facility] [address] [facility] [address]        Dear ,    We are writing to inform you of your test results.    Results within acceptable limits.  Normal crp ( inflammation marker )    Resulted Orders   CBC with platelets differential   Result Value Ref Range    WBC 7.8 4.0 - 11.0 10e9/L    RBC Count 4.50 3.8 - 5.2 10e12/L    Hemoglobin 14.1 11.7 - 15.7 g/dL    Hematocrit 41.6 35.0 - 47.0 %    MCV 92 78 - 100 fl    MCH 31.3 26.5 - 33.0 pg    MCHC 33.9 31.5 - 36.5 g/dL    RDW 13.9 10.0 - 15.0 %    Platelet Count 281 150 - 450 10e9/L    Diff Method Automated Method     % Neutrophils 61.3 %    % Lymphocytes 30.3 %    % Monocytes 6.9 %    % Eosinophils 1.2 %    % Basophils 0.3 %    Absolute Neutrophil 4.8 1.6 - 8.3 10e9/L    Absolute Lymphocytes 2.4 0.8 - 5.3 10e9/L    Absolute Monocytes 0.5 0.0 - 1.3 10e9/L    Absolute Eosinophils 0.1 0.0 - 0.7 10e9/L    Absolute Basophils 0.0 0.0 - 0.2 10e9/L   UA reflex to Microscopic and Culture   Result Value Ref Range    Color Urine Yellow     Appearance Urine Clear     Glucose Urine Negative NEG^Negative mg/dL    Bilirubin Urine Negative NEG^Negative    Ketones Urine Negative NEG^Negative mg/dL    Specific Gravity Urine >1.030 1.003 - 1.035    Blood Urine Negative NEG^Negative    pH Urine 6.0 5.0 - 7.0 pH    Protein Albumin Urine Negative NEG^Negative mg/dL    Urobilinogen Urine 0.2 0.2 - 1.0 EU/dL    Nitrite Urine Negative NEG^Negative    Leukocyte Esterase Urine Negative NEG^Negative    Source Midstream Urine    ESR: Erythrocyte sedimentation rate   Result Value Ref Range    Sed Rate 7 0 - 20 mm/h   CRP, inflammation   Result Value Ref Range    CRP Inflammation <2.9 0.0 - 8.0 mg/L   Hemoglobin A1c   Result Value Ref Range    Hemoglobin A1C 5.3 4.3 - 6.0 %       If you have any questions or concerns, please call the clinic at the number listed above.       Sincerely,        Carli Jesus MD/nr

## 2017-10-24 NOTE — PROGRESS NOTES

## 2017-10-24 NOTE — MR AVS SNAPSHOT
After Visit Summary   10/24/2017    Gloria Benjamin    MRN: [id number]           Patient Information     Date Of Birth          1980        Visit Information        Provider Department      10/24/2017 2:00 PM Carli Jesus MD [facility]        Today's Diagnoses     Back pain, unspecified back location, unspecified back pain laterality, unspecified chronicity    -  1    Alcohol use disorder, moderate, in early remission (H)        Need for prophylactic vaccination and inoculation against influenza        Need for Tdap vaccination        Cervicalgia        Encounter for smoking cessation counseling        Urinary frequency          Care Instructions    Xray back today     Labs today including urine     Continue with being active  and modify activity that causes more pain   Ice then heat 20 min twice a day  Naproxen 500 mg twice a day for 5 days with food  Followed by ibuprofen 600 mg with food three times a day 5 days   Tylenol as needed  Over the counter pain creams to area  Flexeril 5 mg three times a day # 42 no refills  Can make you tired, do not drive or operate machinery on this medication   Therapy and ortho consult for further evaluation and treatment  Follow up with primary if symptoms persist  If get worse such that loose control of bladder , bowels or difficulty walking go to Urgent care/ ER     Flu and Tdap today               Follow-ups after your visit        Additional Services     [facility] PT, HAND, AND CHIROPRACTIC REFERRAL       **This order will print in the [facility] Scheduling Office**    Physical Therapy, Hand Therapy and Chiropractic Care are available through:    *[facility]  *[facility]  *[facility]    Call one number to schedule at any of the above locations: [phone].    Your provider has referred you to: [facility] - PT and/or Chiropractic Care determined by clinical presentation at [facility] or [facility]  Initial Visit    Indication/Reason for Referral: back pain  Onset of Illness: ?  Therapy Orders: Evaluate and Treat  Special Programs: None  Special Request: None    Callum Nolen      Additional Comments for the Therapist or Chiropractor:     Please be aware that coverage of these services is subject to the terms and limitations of your health insurance plan.  Call member services at your health plan with any benefit or coverage questions.      Please bring the following to your appointment:    *Your personal calendar for scheduling future appointments  *Comfortable clothing            ORTHO  REFERRAL       [facility] is referring you to the Orthopedic  Services at [facility].       The  Representative will assist you in the coordination of your Orthopedic and Musculoskeletal Care as prescribed by your physician.    The  Representative will call you within 1 business day to help schedule your appointment, or you may contact the  Representative at:    All areas ~ [phone]     Type of Referral : Spine: Cervical / Thoracic: Medical Spine Specialist   Spine: Lumbar  **Choose Medical Spine Specialist (unless patient was seen by a Medical Spine Specialist within the past 6 months).**  Surgical Evaluation is advised if the patient presents with one or more of the following red flags: Evidence of Spinal Tumor, Infection or Fracture, Cauda Equina Syndrome, Sudden or Progressive Weakness, Loss of Bowel or Bladder Control, or any other documented emergent neurological condition resulting from a Lumbar Spinal Condition. Medical Spine Specialist        Timeframe requested: Within 2 weeks    Coverage of these services is subject to the terms and limitations of your health insurance plan.  Please call member services at your health plan with any benefit or coverage questions.      If X-rays, CT or MRI's have been performed, please contact  "the facility where they were done to arrange for , prior to your scheduled appointment.  Please bring this referral request to your appointment and present it to your specialist.                  Future tests that were ordered for you today     Open Future Orders        Priority Expected Expires Ordered    XR Lumbar Spine 2/3 Views Routine 10/24/2017 10/23/2018 10/24/2017            Who to contact     If you have questions or need follow up information about today's clinic visit or your schedule please contact [facility] directly at [phone].  Normal or non-critical lab and imaging results will be communicated to you by [employer], letter or phone within 4 business days after the clinic has received the results. If you do not hear from us within 7 days, please contact the clinic through [employer] or phone. If you have a critical or abnormal lab result, we will notify you by phone as soon as possible.  Submit refill requests through [employer] or call your pharmacy and they will forward the refill request to us. Please allow 3 business days for your refill to be completed.          Additional Information About Your Visit        [employer] Information     [employer] lets you send messages to your doctor, view your test results, renew your prescriptions, schedule appointments and more. To sign up, go to [email or web address] . Click on \"Log in\" on the left side of the screen, which will take you to the Welcome page. Then click on \"Sign up Now\" on the right side of the page.     You will be asked to enter the access code listed below, as well as some personal information. Please follow the directions to create your username and password.     Your access code is: [email or web address]  Expires: 2018  3:05 PM     Your access code will  in 90 days. If you need help or a new code, please call your [facility] or [phone].        Care EveryWhere ID     This is your Care EveryWhere ID. This could be " used by other organizations to access your [facility] medical records  [id number]        Your Vitals Were     Pulse Temperature Respirations Pulse Oximetry BMI (Body Mass Index)       70 98.9  F (37.2  C) (Oral) 16 99% 28.87 kg/m2        Blood Pressure from Last 3 Encounters:   10/24/17 118/77   05/31/17 118/74   03/20/17 136/90    Weight from Last 3 Encounters:   10/24/17 163 lb (73.9 kg)   05/31/17 156 lb (70.8 kg)   03/20/17 150 lb 3.2 oz (68.1 kg)              We Performed the Following     CBC with platelets differential     Comprehensive metabolic panel     CRP, inflammation     ESR: Erythrocyte sedimentation rate     HC FLU VAC PRESRV FREE QUAD SPLIT VIR 3+YRS IM     Hemoglobin A1c     [facility] PT, HAND, AND CHIROPRACTIC REFERRAL     ORTHO  REFERRAL     Rheumatoid factor     TDAP VACCINE ([facility])     TSH with free T4 reflex     UA reflex to Microscopic and Culture     VACCINE ADMINISTRATION, EACH ADDITIONAL     VACCINE ADMINISTRATION, INITIAL          Today's Medication Changes          These changes are accurate as of: 10/24/17  3:05 PM.  If you have any questions, ask your nurse or doctor.               Start taking these medicines.        Dose/Directions    cyclobenzaprine 5 MG tablet   Commonly known as:  FLEXERIL   Used for:  Back pain, unspecified back location, unspecified back pain laterality, unspecified chronicity, Cervicalgia   Started by:  Carli Jesus MD        Dose:  5 mg   Take 1 tablet (5 mg) by mouth 3 times daily for 14 days   Quantity:  42 tablet   Refills:  0       naproxen 500 MG tablet   Commonly known as:  NAPROSYN   Used for:  Back pain, unspecified back location, unspecified back pain laterality, unspecified chronicity, Cervicalgia   Started by:  Carli Jesus MD        Dose:  500 mg   Take 1 tablet (500 mg) by mouth 2 times daily (with meals) for 5 days   Quantity:  10 tablet   Refills:  0       nicotine 21 MG/24HR 24 hr patch   Commonly known as:  NICODERM CQ   Used for:   Encounter for smoking cessation counseling   Started by:  Carli Jesus MD        Dose:  1 patch   Place 1 patch onto the skin every 24 hours   Quantity:  30 patch   Refills:  0            Where to get your medicines      These medications were sent to [facility] [place], [place] - [address]  [address], [facility] [address]     Phone:  [phone]     cyclobenzaprine 5 MG tablet    naproxen 500 MG tablet    nicotine 21 MG/24HR 24 hr patch                Primary Care Provider    Physician No Ref-Primary       NO REF-PRIMARY PHYSICIAN        Equal Access to Services     [facility]: Hadii aad ku hadasho Soomaali, waaxda luqadaha, qaybta kaalmada adeegyada, waxay kelvinin hayaan maddie wong . So [facility] [phone].    ATENCIÓN: Si habla español, tiene a rangel disposición servicios gratuitos de asistencia lingüística. Llame al [phone].    We comply with applicable federal civil rights laws and [place] laws. We do not discriminate on the basis of race, color, national origin, age, disability, sex, sexual orientation, or gender identity.            Thank you!     Thank you for choosing [facility]  for your care. Our goal is always to provide you with excellent care. Hearing back from our patients is one way we can continue to improve our services. Please take a few minutes to complete the written survey that you may receive in the mail after your visit with us. Thank you!             Your Updated Medication List - Protect others around you: Learn how to safely use, store and throw away your medicines at www.disposemymeds.org.          This list is accurate as of: 10/24/17  3:05 PM.  Always use your most recent med list.                   Brand Name Dispense Instructions for use Diagnosis    cyclobenzaprine 5 MG tablet    FLEXERIL    42 tablet    Take 1 tablet (5 mg) by mouth 3 times daily for 14 days    Back pain, unspecified back location, unspecified back pain  laterality, unspecified chronicity, Cervicalgia       IMITREX PO      Take by mouth every 8 hours as needed for migraine        naproxen 500 MG tablet    NAPROSYN    10 tablet    Take 1 tablet (500 mg) by mouth 2 times daily (with meals) for 5 days    Back pain, unspecified back location, unspecified back pain laterality, unspecified chronicity, Cervicalgia       nicotine 21 MG/24HR 24 hr patch    NICODERM CQ    30 patch    Place 1 patch onto the skin every 24 hours    Encounter for smoking cessation counseling

## 2017-10-25 LAB
ALBUMIN SERPL-MCNC: 3.9 G/DL (ref 3.4–5)
ALP SERPL-CCNC: 73 U/L (ref 40–150)
ALT SERPL W P-5'-P-CCNC: 15 U/L (ref 0–50)
ANION GAP SERPL CALCULATED.3IONS-SCNC: 6 MMOL/L (ref 3–14)
AST SERPL W P-5'-P-CCNC: 4 U/L (ref 0–45)
BILIRUB SERPL-MCNC: 0.3 MG/DL (ref 0.2–1.3)
BUN SERPL-MCNC: 13 MG/DL (ref 7–30)
CALCIUM SERPL-MCNC: 8.9 MG/DL (ref 8.5–10.1)
CHLORIDE SERPL-SCNC: 108 MMOL/L (ref 94–109)
CO2 SERPL-SCNC: 25 MMOL/L (ref 20–32)
CREAT SERPL-MCNC: 0.68 MG/DL (ref 0.52–1.04)
GFR SERPL CREATININE-BSD FRML MDRD: >90 ML/MIN/1.7M2
GLUCOSE SERPL-MCNC: 83 MG/DL (ref 70–99)
POTASSIUM SERPL-SCNC: 4.2 MMOL/L (ref 3.4–5.3)
PROT SERPL-MCNC: 7 G/DL (ref 6.8–8.8)
RHEUMATOID FACT SER NEPH-ACNC: <20 IU/ML (ref 0–20)
SODIUM SERPL-SCNC: 139 MMOL/L (ref 133–144)
TSH SERPL DL<=0.005 MIU/L-ACNC: 2.11 MU/L (ref 0.4–4)

## 2017-10-25 NOTE — PROGRESS NOTES
Results within acceptable limits.  -Liver and gallbladder tests are normal. (ALT,AST, Alk phos, bilirubin), kidney function is normal (Cr, GFR), Sodium is normal, Potassium is normal, Calcium is normal, Glucose is normal (diabetes screening test).   -TSH (thyroid stimulating hormone) level is normal which indicates normal thyroid function..

## 2017-11-08 ENCOUNTER — THERAPY VISIT (OUTPATIENT)
Dept: PHYSICAL THERAPY | Facility: CLINIC | Age: 37
End: 2017-11-08
Payer: COMMERCIAL

## 2017-11-08 DIAGNOSIS — M54.2 CERVICALGIA: Primary | ICD-10-CM

## 2017-11-08 DIAGNOSIS — M54.41 ACUTE RIGHT-SIDED LOW BACK PAIN WITH RIGHT-SIDED SCIATICA: ICD-10-CM

## 2017-11-08 PROCEDURE — 97110 THERAPEUTIC EXERCISES: CPT | Mod: GP | Performed by: PHYSICAL THERAPIST

## 2017-11-08 PROCEDURE — 97161 PT EVAL LOW COMPLEX 20 MIN: CPT | Mod: GP | Performed by: PHYSICAL THERAPIST

## 2017-11-08 NOTE — PROGRESS NOTES
Subjective:    Patient is a 36 year old female presenting with rehab cervical spine hpi and rehab back hpi.   Gloria Benjamin is a 36 year old female with a cervical spine condition.  Condition occurred with:  Insidious onset.  Condition occurred: for unknown reasons.  This is a chronic condition  Onset of cervical pain about 7 months ago  (April 2017). was in treatment for alcohol. And while she was there she was noticing her pain more. Wonders if pain had been present longer than this but alcohol had been covering it up. Prior to this was a  and standing and hard surfaces for 16 hours a day, which she assumed had not been good for her back..    Patient reports pain:  Central cervical spine (midline).  Radiates to:  Head, shoulder right and shoulder left (general all over headache (also has history of migraine)).  Pain is described as sharp and stabbing and is intermittent and reported as 7/10.  Associated symptoms:  Loss of motion/stiffness, headache and numbness. Pain is the same all the time.  Symptoms are exacerbated by looking up or down, other and lying down (laying down and reading or when texting/using phone causes hands to go numb) and relieved by other (good pillows).  Since onset symptoms are gradually worsening.  Special tests:  X-ray (will be having full spinal MRI).  Previous treatment: none.  Improvement with previous treatment: NA.  General health as reported by patient is fair.                            Gloria Benjamin is a 36 year old female with a lumbar condition.  Condition occurred with:  Insidious onset.  Condition occurred: for unknown reasons.  This is a chronic condition  Around April 2017(see above for history) - onset of LBP. Initially pain was on both sides, with occasional pain in R leg. About 1 week ago felt like she pulled a muscle while gently pushing a shelf and had significant increased pain in right low back and down right leg. Since then pain has somewhat lessened..     Patient reports pain:  Lower lumbar spine, lumbar spine right and lumbar spine left (R>L).  Radiates to:  Thigh right and knee right.  Pain is described as aching, sharp, burning and stabbing and is constant Pain Scale: 8/10 at worst.  Associated symptoms:  Loss of motion/stiffness, numbness and tingling. Pain is worse in the P.M..  Symptoms are exacerbated by other, sitting and bending (sleeping on stomach (which she prefers to do), laying flat on back, walking long term) and relieved by heat and muscle relaxants.  Since onset symptoms are gradually worsening (this past week).  Special tests:  X-ray.  Previous treatment: none.  Improvement with previous treatment: NA.  General health as reported by patient is fair.                                              Objective:    Standing Alignment:    Cervical/Thoracic:  Forward head  Shoulder/UE:  Rounded shoulders                             Lumbar/SI Evaluation  ROM:    AROM Lumbar:   Flexion:        60% - incr R LBP and glute pain  Ext:                    20% -midline pain   Side Bend:        Left:     Right:   Rotation:           Left:     Right:   Side Glide:        Left:     Right:           Lumbar Myotomes:    T12-L3 (Hip Flex):  Left: 5    Right: 5  L2-4 (Quads):  Left:  5    Right:  5  L4 (Ankle DF):  Left:  5    Right:  5  L5 (Great Toe Ext): Left: 5    Right: 5-   S1 (Toe Raise):  Left: 5    Right: 5-  Lumbar DTR's:  not assessed        Lumbar Dermtomes:        L2 Left:  Normal-light touch     L2 Right:  Hypo-light touch  L3 Left:  Normal-light touch     L3 Right:  Normal-light touch  L4 Left:  Normal-light touch       L4 Right:  Hypo-light touch  L5 Left:  Hyper-light touch     L5 Right:  Normal-light touch  S1 Left:  Normal-light touch     S1 Right:  Normal-light touch  Neural Tension/Mobility:  Neural tension wnl lumbar: (+) SLR on R and (+) crossed SLR on L with reproduction of R glute pain at 30-40 deg.    Left side:  SLR and Slump positive.   Right  side:   Slump and SLR positive.  Lumbar Palpation:    Tenderness present at Left:    Erector Spinae  Tenderness present at Right: Erector Spinae    Lumbar Provocation:  Lumbar provocation: decr pain with spring tesing to lumbar spine.  Left positive with:  Mobility  Right positive with: Mobility         Cervical/Thoracic Evaluation    AROM:  AROM Cervical:    Flexion:            80% - posterior tightness  Extension:       50% - midline ERP  Rotation:         Left: 90% - tightness     Right:  - tightness  Side Bend:      Left: 80% - tightness     Right:  70%  - tightness    Strength: hip extension: L 5/5, R 4+/5 (pain)                                                              Kay Cervical Evaluation    Posture:  Sitting: fair  Standing: fair  Protruding Head: yes  Wry Neck: no      Movement Loss:    Flexion (Flex): min    Extension (EXT): mod and pain  Lateral Flexion Right (LF R): min  Lateral Flexion Left (LF L): min  Rotation Right (ROT R): min  Rotation Left (ROT L): min  Test Movements:      RET: During: no effect  After: no effect    Repeat RET: During: no effect  After: no effect  Mechanical Response: IncROM                          Conclusion: derangement  Principle of Treatment:  Posture Correction: postural awareness and strength        Other: repeated retraction progression  Kay Lumbar Evaluation    Posture:  Sitting: fair  Standing: fair    Lateral Shift: no      Movement Loss:  Flexion (Flex): mod and pain  Extension (EXT): major and pain      Test Movements:    EIS: During: increases  After: no worse    Repeat EIS: During: no effect  After: no effect            Static Tests:          Lying Prone in Extension: over 2 pillows - no large affect. without pillows - incr R post thigh numbness. MARLON - incr R post thigh numbness    Conclusion: derangement  Principle of Treatment:      Extension: trial prone lying over pillows with progress to flat as able    Other: MRI tomorrow will assist with  diagnosis d/t +crossed SLR and +crossed slump testing and poor ability to perform repeated motion testing                                       ROS    Assessment/Plan:      Patient is a 36 year old female with cervical and lumbar complaints.    Patient has the following significant findings with corresponding treatment plan.                Diagnosis 1:  Cervical derangement    Pain -  manual therapy, self management, education, directional preference exercise and home program  Decreased ROM/flexibility - manual therapy, therapeutic exercise and home program  Decreased function - therapeutic activities and home program  Impaired posture - neuro re-education, therapeutic activities and home program  Diagnosis 2:  Right lumbar radiculopathy     Pain -  manual therapy, self management, education, directional preference exercise and home program  Decreased ROM/flexibility - manual therapy, therapeutic exercise and home program  Decreased strength - therapeutic exercise, therapeutic activities and home program  Decreased function - therapeutic activities and home program  Impaired posture - neuro re-education, therapeutic activities and home program    Therapy Evaluation Codes:   1) History comprised of:   Personal factors that impact the plan of care:      None.    Comorbidity factors that impact the plan of care are:      Chemical Dependency, Migraines/headaches, Overweight, Pain at night/rest, Smoking and Weakness.     Medications impacting care: Muscle relaxant.  2) Examination of Body Systems comprised of:   Body structures and functions that impact the plan of care:      Cervical spine and Lumbar spine.   Activity limitations that impact the plan of care are:      Bending, Reading/Computer work, Sitting, Standing, Walking, Sleeping and Laying down.  3) Clinical presentation characteristics are:   Evolving/Changing. - d/t recent exacerbation 1 week ago for low back with slight changes/progress since  then  4) Decision-Making    Low complexity using standardized patient assessment instrument and/or measureable assessment of functional outcome.  Cumulative Therapy Evaluation is: Low complexity.    Previous and current functional limitations:  (See Goal Flow Sheet for this information)    Short term and Long term goals: (See Goal Flow Sheet for this information)     Communication ability:  Patient appears to be able to clearly communicate and understand verbal and written communication and follow directions correctly.  Treatment Explanation - The following has been discussed with the patient:   RX ordered/plan of care  Anticipated outcomes  Possible risks and side effects  This patient would benefit from PT intervention to resume normal activities.   Rehab potential is good.    Frequency:  1 X week, once daily  Duration:  for 8 weeks  Discharge Plan:  Achieve all LTG.  Independent in home treatment program.  Reach maximal therapeutic benefit.    Please refer to the daily flowsheet for treatment today, total treatment time and time spent performing 1:1 timed codes.

## 2017-11-08 NOTE — MR AVS SNAPSHOT
After Visit Summary   11/8/2017    Gloria Benjamin    MRN: [id number]           Patient Information     Date Of Birth          1980        Visit Information        Provider Department      11/8/2017 3:20 PM Yuly Cage PT [facility] [place]        Today's Diagnoses     Cervicalgia    -  1    Acute right-sided low back pain with right-sided sciatica           Follow-ups after your visit        Your next 10 appointments already scheduled     Nov 09, 2017  8:00 AM CST   New Patient Visit with Merry Macdonald NP   [facility] ([facility])    [address] [address]   [phone]            Nov 16, 2017  1:30 PM CST   GABBIE Spine with Yuly Cage PT   [email or web address] [place] ([facility])    [address] [place] [id number] [phone]            Nov 22, 2017 10:50 AM CST   GABBIE Spine with Yuly Cage PT   [facility] [place] ([facility])    [address] [place] [id number] [phone]              Who to contact     If you have questions or need follow up information about today's clinic visit or your schedule please contact [facility] directly at [phone].  Normal or non-critical lab and imaging results will be communicated to you by [employer], letter or phone within 4 business days after the clinic has received the results. If you do not hear from us within 7 days, please contact the clinic through [employer] or phone. If you have a critical or abnormal lab result, we will notify you by phone as soon as possible.  Submit refill requests through [employer] or call your pharmacy and they will forward the refill request to us. Please allow 3 business days for your refill to be completed.          Additional Information About Your Visit        Ox[employer] Information     [employer] lets you send messages  "to your doctor, view your test results, renew your prescriptions, schedule appointments and more. To sign up, go to [email or web address] . Click on \"Log in\" on the left side of the screen, which will take you to the Welcome page. Then click on \"Sign up Now\" on the right side of the page.     You will be asked to enter the access code listed below, as well as some personal information. Please follow the directions to create your username and password.     Your access code is: [email or web address]  Expires: 2018  2:05 PM     Your access code will  in 90 days. If you need help or a new code, please call your [facility] clinic or [phone].        Care EveryWhere ID     This is your Care EveryWhere ID. This could be used by other organizations to access your [facility] medical records  [id number]         Blood Pressure from Last 3 Encounters:   10/24/17 118/77   17 118/74   17 136/90    Weight from Last 3 Encounters:   10/24/17 73.9 kg (163 lb)   17 70.8 kg (156 lb)   17 68.1 kg (150 lb 3.2 oz)              We Performed the Following     HC PT EVAL, LOW COMPLEXITY     GABBIE INITIAL EVAL REPORT     THERAPEUTIC EXERCISES        Primary Care Provider    Physician No Ref-Primary       NO REF-PRIMARY PHYSICIAN        Equal Access to Services     ANUJA SALVADOR AH: Hadii aad ku hadasho Soabdiali, waaxda luqadaha, qaybta kaalmada jennifer candelario . So [facility] [phone].    ATENCIÓN: Si habla español, tiene a rangel disposición servicios gratuitos de asistencia lingüística. Humberto al [phone].    We comply with applicable federal civil rights laws and [place] laws. We do not discriminate on the basis of race, color, national origin, age, disability, sex, sexual orientation, or gender identity.            Thank you!     Thank you for choosing [facility]  for your care. Our goal is always to provide you with excellent care. Hearing back " from our patients is one way we can continue to improve our services. Please take a few minutes to complete the written survey that you may receive in the mail after your visit with us. Thank you!             Your Updated Medication List - Protect others around you: Learn how to safely use, store and throw away your medicines at www.disposemymeds.org.          This list is accurate as of: 11/8/17  5:20 PM.  Always use your most recent med list.                   Brand Name Dispense Instructions for use Diagnosis    IMITREX PO      Take by mouth every 8 hours as needed for migraine        nicotine 21 MG/24HR 24 hr patch    NICODERM CQ    30 patch    Place 1 patch onto the skin every 24 hours    Encounter for smoking cessation counseling       RESTASIS 0.05 % ophthalmic emulsion   Generic drug:  cycloSPORINE

## 2017-11-09 ENCOUNTER — OFFICE VISIT (OUTPATIENT)
Dept: FAMILY MEDICINE | Facility: CLINIC | Age: 37
End: 2017-11-09

## 2017-11-09 VITALS
DIASTOLIC BLOOD PRESSURE: 80 MMHG | TEMPERATURE: 98.2 F | SYSTOLIC BLOOD PRESSURE: 113 MMHG | HEIGHT: 65 IN | HEART RATE: 85 BPM | BODY MASS INDEX: 27.04 KG/M2 | RESPIRATION RATE: 16 BRPM | WEIGHT: 162.3 LBS | OXYGEN SATURATION: 97 %

## 2017-11-09 DIAGNOSIS — M54.2 CERVICALGIA: ICD-10-CM

## 2017-11-09 DIAGNOSIS — M54.41 ACUTE RIGHT-SIDED LOW BACK PAIN WITH RIGHT-SIDED SCIATICA: Primary | ICD-10-CM

## 2017-11-09 RX ORDER — LORAZEPAM 1 MG/1
0.5 TABLET ORAL ONCE
Qty: 1 TABLET | Refills: 0 | Status: SHIPPED | OUTPATIENT
Start: 2017-11-09 | End: 2017-11-09

## 2017-11-09 RX ORDER — CYCLOBENZAPRINE HCL 5 MG
5 TABLET ORAL 2 TIMES DAILY PRN
COMMUNITY
End: 2018-12-05

## 2017-11-09 RX ORDER — METHYLPREDNISOLONE 4 MG
TABLET, DOSE PACK ORAL
Qty: 21 TABLET | Refills: 0 | Status: SHIPPED | OUTPATIENT
Start: 2017-11-09 | End: 2018-01-18

## 2017-11-09 ASSESSMENT — ANXIETY QUESTIONNAIRES
6. BECOMING EASILY ANNOYED OR IRRITABLE: NOT AT ALL
GAD7 TOTAL SCORE: 0
3. WORRYING TOO MUCH ABOUT DIFFERENT THINGS: NOT AT ALL
2. NOT BEING ABLE TO STOP OR CONTROL WORRYING: NOT AT ALL
7. FEELING AFRAID AS IF SOMETHING AWFUL MIGHT HAPPEN: NOT AT ALL
5. BEING SO RESTLESS THAT IT IS HARD TO SIT STILL: NOT AT ALL
IF YOU CHECKED OFF ANY PROBLEMS ON THIS QUESTIONNAIRE, HOW DIFFICULT HAVE THESE PROBLEMS MADE IT FOR YOU TO DO YOUR WORK, TAKE CARE OF THINGS AT HOME, OR GET ALONG WITH OTHER PEOPLE: NOT DIFFICULT AT ALL
1. FEELING NERVOUS, ANXIOUS, OR ON EDGE: NOT AT ALL

## 2017-11-09 ASSESSMENT — PATIENT HEALTH QUESTIONNAIRE - PHQ9
5. POOR APPETITE OR OVEREATING: NOT AT ALL
SUM OF ALL RESPONSES TO PHQ QUESTIONS 1-9: 0

## 2017-11-09 ASSESSMENT — PAIN SCALES - GENERAL: PAINLEVEL: EXTREME PAIN (8)

## 2017-11-09 NOTE — MR AVS SNAPSHOT
After Visit Summary   11/9/2017    Gloria Benjamin    MRN: [id number]           Patient Information     Date Of Birth          1980        Visit Information        Provider Department      11/9/2017 8:00 AM Merry Macdonald NP [facility]        Today's Diagnoses     Acute right-sided low back pain with right-sided sciatica    -  1    Cervicalgia          Care Instructions    Start Medrol dosepak  Continue PT add deep tissue work  MRI (premedicate)  [facility]          Follow-ups after your visit        Your next 10 appointments already scheduled     Nov 16, 2017  1:30 PM CST   GABBIE Spine with Yuly Cage PT   [facility] [place] ([facility])    [address] [facility] [id number] [phone]            Nov 22, 2017 10:50 AM CST   GABBIE Spine with Yuly Cage PT   [facility] [place] ([facility])    [address] [facility] [id number] [phone]              Future tests that were ordered for you today     Open Future Orders        Priority Expected Expires Ordered    MR Cervical Spine w/o & w Contrast Routine  11/9/2018 11/9/2017            Who to contact     Please call your clinic at [phone] to:    Ask questions about your health    Make or cancel appointments    Discuss your medicines    Learn about your test results    Speak to your doctor   If you have compliments or concerns about an experience at your clinic, or if you wish to file a complaint, please contact [facility] Patient Relations at [phone] or email us at [email or web address]         Additional Information About Your Visit        [facility] Information     [employer]elt is an electronic gateway that provides easy, online access to your medical records. With [employer], you can request a clinic appointment, read your test results, renew a prescription or communicate with  "your care team.     To sign up for MyChart visit the website at [email or web address]   You will be asked to enter the access code listed below, as well as some personal information. Please follow the directions to create your username and password.     Your access code is: [email or web address]  Expires: 2018  2:05 PM     Your access code will  in 90 days. If you need help or a new code, please contact your [facility] or call [phone] for assistance.        Care EveryWhere ID     This is your Care EveryWhere ID. This could be used by other organizations to access your [facility] medical records  [id number]        Your Vitals Were     Pulse Temperature Respirations Height Last Period Pulse Oximetry    85 98.2  F (36.8  C) (Oral) 16 5' 5\" (165.1 cm) 10/26/2017 (Approximate) 97%    Breastfeeding? BMI (Body Mass Index)                No 27.01 kg/m2           Blood Pressure from Last 3 Encounters:   17 113/80   10/24/17 118/77   17 118/74    Weight from Last 3 Encounters:   17 162 lb 4.8 oz (73.6 kg)   10/24/17 163 lb (73.9 kg)   17 156 lb (70.8 kg)                 Today's Medication Changes          These changes are accurate as of: 17  9:01 AM.  If you have any questions, ask your nurse or doctor.               Start taking these medicines.        Dose/Directions    LORazepam 1 MG tablet   Commonly known as:  ATIVAN   Used for:  Acute right-sided low back pain with right-sided sciatica   Started by:  Merry Macdonald NP        Dose:  0.5 mg   Take 0.5 tablets (0.5 mg) by mouth once for 1 dose   Quantity:  1 tablet   Refills:  0       methylPREDNISolone 4 MG tablet   Commonly known as:  MEDROL DOSEPAK   Used for:  Acute right-sided low back pain with right-sided sciatica   Started by:  Merry Macdonald NP        Follow package instructions   Quantity:  21 tablet   Refills:  0            Where to get your medicines      These medications were sent " to [facility] [place], [place] - [address]  [address], [facility] [address]     Phone:  [phone]     methylPREDNISolone 4 MG tablet         Some of these will need a paper prescription and others can be bought over the counter.  Ask your nurse if you have questions.     Bring a paper prescription for each of these medications     LORazepam 1 MG tablet                Primary Care Provider    Physician No Ref-Primary       NO REF-PRIMARY PHYSICIAN        Equal Access to Services     ANUJA SALVADOR : Hadii sam ku hadasho Soomaali, waaxda luqadaha, qaybta kaalmada adeegyada, waxay kelvinin hayjilliann maddie workman. So [facility] [phone].    ATENCIÓN: Si fiordaliza hayes, tiene a rangel disposición servicios gratuitos de asistencia lingüística. Humberto al [phone].    We comply with applicable federal civil rights laws and [place] laws. We do not discriminate on the basis of race, color, national origin, age, disability, sex, sexual orientation, or gender identity.            Thank you!     Thank you for choosing [facility]  for your care. Our goal is always to provide you with excellent care. Hearing back from our patients is one way we can continue to improve our services. Please take a few minutes to complete the written survey that you may receive in the mail after your visit with us. Thank you!             Your Updated Medication List - Protect others around you: Learn how to safely use, store and throw away your medicines at www.disposemymeds.org.          This list is accurate as of: 11/9/17  9:01 AM.  Always use your most recent med list.                   Brand Name Dispense Instructions for use Diagnosis    cyclobenzaprine 5 MG tablet    FLEXERIL     Take 5 mg by mouth 2 times daily as needed for muscle spasms        IMITREX PO      Take by mouth every 8 hours as needed for migraine        LORazepam 1 MG tablet    ATIVAN    1 tablet    Take 0.5 tablets (0.5 mg) by mouth  once for 1 dose    Acute right-sided low back pain with right-sided sciatica       methylPREDNISolone 4 MG tablet    MEDROL DOSEPAK    21 tablet    Follow package instructions    Acute right-sided low back pain with right-sided sciatica       nicotine 21 MG/24HR 24 hr patch    NICODERM CQ    30 patch    Place 1 patch onto the skin every 24 hours    Encounter for smoking cessation counseling       RESTASIS 0.05 % ophthalmic emulsion   Generic drug:  cycloSPORINE

## 2017-11-09 NOTE — NURSING NOTE
"36 year old  Chief Complaint   Patient presents with     Back Pain     and neck pain x 7 months. She was doing laundry and pt thinks she pulled a muscle in her back.        Blood pressure 113/80, pulse 85, temperature 98.2  F (36.8  C), temperature source Oral, resp. rate 16, height 5' 5\" (165.1 cm), weight 162 lb 4.8 oz (73.6 kg), last menstrual period 10/26/2017, SpO2 97 %, not currently breastfeeding. Body mass index is 27.01 kg/(m^2).  BP completed using cuff size: regular    Patient Active Problem List   Diagnosis     Abnormal Pap smear of cervix     Cervicalgia     Acute right-sided low back pain with right-sided sciatica       Wt Readings from Last 2 Encounters:   11/09/17 162 lb 4.8 oz (73.6 kg)   10/24/17 163 lb (73.9 kg)     BP Readings from Last 3 Encounters:   11/09/17 113/80   10/24/17 118/77   05/31/17 118/74       Current Outpatient Prescriptions   Medication     cyclobenzaprine (FLEXERIL) 5 MG tablet     SUMAtriptan Succinate (IMITREX PO)     nicotine (NICODERM CQ) 21 MG/24HR 24 hr patch     RESTASIS 0.05 % ophthalmic emulsion     No current facility-administered medications for this visit.        Social History   Substance Use Topics     Smoking status: Current Every Day Smoker     Packs/day: 1.00     Types: Cigarettes     Smokeless tobacco: Never Used     Alcohol use No      Comment: sober since 7 months 3/2017        There are no preventive care reminders to display for this patient.    Lab Results   Component Value Date    PAP NIL 05/31/2017       PHQ-2 ( 1999 Pfizer) 11/9/2017 10/24/2017   Q1: Little interest or pleasure in doing things 0 0   Q2: Feeling down, depressed or hopeless 0 0   PHQ-2 Score 0 0       PHQ-9 SCORE 11/9/2017   Total Score 0       YAO-7 SCORE 11/9/2017   Total Score 0       No flowsheet data found.    Uma Moya MA  November 9, 2017 8:09 AM  "

## 2017-11-09 NOTE — PROGRESS NOTES
Subjective:    Patient is a 36 year old female presenting with rehab left ankle/foot hpi.                    and reported as 8/10.                General health as reported by patient is fair.  Pertinent medical history includes:  Overweight and migraines.                          Oswestry Score: 56 %                 Objective:    System    Physical Exam    General     ROS    Assessment/Plan:

## 2017-11-09 NOTE — PROGRESS NOTES
"Gloria Benjamin is a 36 year old female who presents today for an evaluation of severe back and neck pain.    Low back pain:  Gloria has a PCP in the [place] system whom she saw 2 weeks ago for severe back pain.  She has had pain for 7 months, there was no initial event, she felt it might be because she is more sedentary than usual.  She feels pain in right lower back that radiates down the back of her right leg to the knee.  Recently, about 2 weeks ago she was standing and felt a spasm in her right back that \"almost brought me to my knees.\"  She went to her PCP who did xrays, recommended PT and recommended Ortho referral.  [facility] the  who recommended that she follow up for possible MRI and further evaluation.  She has been to PT one time, yesterday.    Gloria has been prescribed a muscle relaxant medication that seemed to work for a few days, but now has minimal to no impact on her back pain.  It makes her dizzy and lightheaded, but does not do anything for the pain.    Neck pain:  Golria has had chronic in her neck for the same 7 months, stiffness and ROM is limited by this.  She again feels it might be associated with her change in activity level.      Review Of Systems  Skin: negative  Eyes: negative  Ears/Nose/Throat: negative  Respiratory: No shortness of breath, dyspnea on exertion, cough, or hemoptysis.  Uses tobacco  Cardiovascular: negative  Gastrointestinal: negative  Genitourinary: negative  Musculoskeletal: as above  Neurologic: as above, concussion 6 years ago  Psychiatric: negative.  7 months cessation of alcohol use.  Went through SA treatment and is doing well.  Hematologic/Lymphatic/Immunologic: negative  Endocrine: negative    Past Medical History:   Diagnosis Date     Abnormal Pap smear of cervix date unknown    Pt reported, abnormal pap in late teens, results unknown.     Substance abuse      Past Surgical History:   Procedure Laterality Date     APPENDECTOMY       GYN " "SURGERY      Ovarian cyst removed     Social History     Social History     Marital status: Single     Spouse name: N/A     Number of children: N/A     Years of education: N/A     Occupational History     Not on file.     Social History Main Topics     Smoking status: Current Every Day Smoker     Packs/day: 1.00     Types: Cigarettes     Smokeless tobacco: Never Used     Alcohol use No      Comment: sober since 7 months 3/2017      Drug use: No     Sexual activity: Yes     Partners: Male     Birth control/ protection: Condom     Other Topics Concern     Not on file     Social History Narrative     No family history on file.    /80 (BP Location: Right arm, Patient Position: Chair, Cuff Size: Adult Regular)  Pulse 85  Temp 98.2  F (36.8  C) (Oral)  Resp 16  Ht 5' 5\" (165.1 cm)  Wt 162 lb 4.8 oz (73.6 kg)  LMP 10/26/2017 (Approximate)  SpO2 97%  Breastfeeding? No  BMI 27.01 kg/m2    Exam:  Constitutional: healthy, alert and moderate distress.    Head: Normocephalic. No masses, lesions, tenderness or abnormalities  Neck: Neck supple. No adenopathy. Thyroid symmetric, normal size,, Carotids without bruits.  ENT: ENT exam normal, no neck nodes or sinus tenderness  Cardiovascular: negative, PMI normal. No lifts, heaves, or thrills. RRR. No murmurs, clicks gallops or rub  Respiratory: negative, Percussion normal. Good diaphragmatic excursion. Lungs clear  : Deferred  Musculoskeletal: ROM neck limited by stiffness and a \"pull\" on both sides.  Very painful flexion and extension of the low and right mid back with all ROM.  Straight leg raise on both left and right illicit pain in the right low back and radicular pain down the back of the right leg.    Skin: no suspicious lesions or rashes  Neurologic: Gait normal. Reflexes normal and symmetric. Sensation grossly WNL.  Psychiatric: mentation appears normal and affect normal/bright    Back pain assessments complete.    Assessment/Plan:  1. Acute right-sided low " back pain with right-sided sciatica    - MR Cervical Spine w/o & w Contrast; Future  - LORazepam (ATIVAN) 1 MG tablet; Take 0.5 tablets (0.5 mg) by mouth once for 1 dose  Dispense: 1 tablet; Refill: 0 (for sedation for MRI)- remove metal (piercings) before MRI  - methylPREDNISolone (MEDROL DOSEPAK) 4 MG tablet; Follow package instructions  Dispense: 21 tablet; Refill: 0  Stop Flexeril      2. Cervicalgia

## 2017-11-10 ASSESSMENT — ANXIETY QUESTIONNAIRES: GAD7 TOTAL SCORE: 0

## 2017-11-24 ENCOUNTER — TELEPHONE (OUTPATIENT)
Dept: FAMILY MEDICINE | Facility: CLINIC | Age: 37
End: 2017-11-24

## 2017-11-24 ENCOUNTER — HOSPITAL ENCOUNTER (OUTPATIENT)
Dept: MRI IMAGING | Facility: CLINIC | Age: 37
Discharge: HOME | End: 2017-11-24
Attending: NURSE PRACTITIONER | Admitting: NURSE PRACTITIONER
Payer: COMMERCIAL

## 2017-11-24 ENCOUNTER — HOSPITAL ENCOUNTER (OUTPATIENT)
Dept: MRI IMAGING | Facility: CLINIC | Age: 37
End: 2017-11-24
Attending: NURSE PRACTITIONER
Payer: COMMERCIAL

## 2017-11-24 DIAGNOSIS — M54.41 ACUTE RIGHT-SIDED LOW BACK PAIN WITH RIGHT-SIDED SCIATICA: ICD-10-CM

## 2017-11-24 DIAGNOSIS — M54.41 ACUTE RIGHT-SIDED LOW BACK PAIN WITH RIGHT-SIDED SCIATICA: Primary | ICD-10-CM

## 2017-11-24 LAB — RADIOLOGIST FLAGS: NORMAL

## 2017-11-24 PROCEDURE — 72148 MRI LUMBAR SPINE W/O DYE: CPT

## 2017-11-24 PROCEDURE — 72141 MRI NECK SPINE W/O DYE: CPT

## 2017-11-24 NOTE — TELEPHONE ENCOUNTER
Returning call from radiology tech at [facility], Jude Valdes. Spoke with both Jude and Gloria individually over the phone regarding the plan for today's imaging. Merry Macdonald NP had ordered MR Cervical Spine w/o & w Contrast on 11/9/17 after seeing Gloria in clinic for acute right-sided low back pain with right-sided sciatica. Gloria was under the impression that she was having an MR of her cervical spine and her lower back, however only the MR Cervical Spine had been ordered for today. After reviewing progress note by Merry Macdonald NP from 11/9/17, I feel it is appropriate to add on MR Lumbar spine w/o contrast to be completed today while patient is at [facility] imaging/radiology. Patient is in agreement with this plan and has no further questions or concerns.

## 2017-11-27 ENCOUNTER — TELEPHONE (OUTPATIENT)
Dept: FAMILY MEDICINE | Facility: CLINIC | Age: 37
End: 2017-11-27

## 2017-11-27 NOTE — TELEPHONE ENCOUNTER
Patsy Carver is calling from [facility] regarding a imaging finding for 11/24/17. She would like a call back at the incoming number.

## 2017-11-28 ENCOUNTER — TELEPHONE (OUTPATIENT)
Dept: FAMILY MEDICINE | Facility: CLINIC | Age: 37
End: 2017-11-28

## 2017-11-28 DIAGNOSIS — G89.29 CHRONIC RIGHT-SIDED LOW BACK PAIN WITH RIGHT-SIDED SCIATICA: Primary | ICD-10-CM

## 2017-11-28 DIAGNOSIS — M54.41 CHRONIC RIGHT-SIDED LOW BACK PAIN WITH RIGHT-SIDED SCIATICA: Primary | ICD-10-CM

## 2017-11-28 DIAGNOSIS — M54.41 ACUTE RIGHT-SIDED LOW BACK PAIN WITH RIGHT-SIDED SCIATICA: Primary | ICD-10-CM

## 2017-11-28 RX ORDER — TRAMADOL HYDROCHLORIDE 50 MG/1
50-100 TABLET ORAL EVERY 6 HOURS PRN
Qty: 20 TABLET | Refills: 0 | Status: CANCELLED | OUTPATIENT
Start: 2017-11-28

## 2017-11-28 RX ORDER — HYDROCODONE BITARTRATE AND ACETAMINOPHEN 5; 325 MG/1; MG/1
1 TABLET ORAL EVERY 6 HOURS PRN
Qty: 18 TABLET | Refills: 0 | Status: SHIPPED | OUTPATIENT
Start: 2017-11-28 | End: 2018-01-18

## 2017-11-28 NOTE — TELEPHONE ENCOUNTER
Left a VM stating that her prescription is ready to  at this clinic and for her to give me a call back at the clinic at [phone].

## 2017-11-28 NOTE — TELEPHONE ENCOUNTER
I spoke with Gloria to let her know that I have contacted Dr. Mane's office to ask them to review MR of spine and let us know of next steps.      As for the incidental finding noted in the cervical spine MR, I told her we would see what the spine group says, then go from there.  We will follow the recommendation of radiology for an MRA when we hear from Dr. Mane's group, to prioritize events.    Gloria is in significant pain, she does not get as much as 3 hours of sleep/night.  She has no history of SA.  We will prescribe her Vicodin for pain.  She will  the RX this afternoon.

## 2017-11-28 NOTE — TELEPHONE ENCOUNTER
Patient is calling to get an update on her MRI results she had done on 11/24/2017.  Sharri Kumar CMA  11/28/2017 10:49AM

## 2017-11-28 NOTE — TELEPHONE ENCOUNTER
Louise Grijalva is going to look into her insurance and options for getting Gloria the RX that we are not able to access through the usual channels.  She will contact Gloria to let her know we are working on this.    I will follow up with Louise midday today to resolve the issue.

## 2017-11-30 ENCOUNTER — TELEPHONE (OUTPATIENT)
Dept: FAMILY MEDICINE | Facility: CLINIC | Age: 37
End: 2017-11-30

## 2017-11-30 ENCOUNTER — MYC MEDICAL ADVICE (OUTPATIENT)
Dept: FAMILY MEDICINE | Facility: CLINIC | Age: 37
End: 2017-11-30

## 2017-11-30 NOTE — TELEPHONE ENCOUNTER
Patient had a MRI done and would like to discuss with you the results. She can be reached at the incoming number.

## 2017-12-01 ENCOUNTER — THERAPY VISIT (OUTPATIENT)
Dept: PHYSICAL THERAPY | Facility: CLINIC | Age: 37
End: 2017-12-01
Payer: COMMERCIAL

## 2017-12-01 DIAGNOSIS — M54.41 ACUTE RIGHT-SIDED LOW BACK PAIN WITH RIGHT-SIDED SCIATICA: ICD-10-CM

## 2017-12-01 DIAGNOSIS — M54.2 CERVICALGIA: ICD-10-CM

## 2017-12-01 PROCEDURE — 97140 MANUAL THERAPY 1/> REGIONS: CPT | Mod: GP | Performed by: PHYSICAL THERAPIST

## 2017-12-01 PROCEDURE — 97110 THERAPEUTIC EXERCISES: CPT | Mod: GP | Performed by: PHYSICAL THERAPIST

## 2017-12-05 ENCOUNTER — TELEPHONE (OUTPATIENT)
Dept: FAMILY MEDICINE | Facility: CLINIC | Age: 37
End: 2017-12-05

## 2017-12-05 NOTE — TELEPHONE ENCOUNTER
APPT INFO    Date /Time: 12/8/17 8AM   Reason for Appt: Back Pain/ DDD   Ref Provider/Clinic: Dr. Carli Jesus   Are there internal records? If yes, list: Yes -     [facility] -- imaging in pacs   Patient Contact (Y/N) & Call Details: No - internal referral   Action: --

## 2017-12-07 DIAGNOSIS — M54.42 BILATERAL LOW BACK PAIN WITH LEFT-SIDED SCIATICA, UNSPECIFIED CHRONICITY: Primary | ICD-10-CM

## 2017-12-08 ENCOUNTER — OFFICE VISIT (OUTPATIENT)
Dept: ORTHOPEDICS | Facility: CLINIC | Age: 37
End: 2017-12-08

## 2017-12-08 ENCOUNTER — PRE VISIT (OUTPATIENT)
Dept: ORTHOPEDICS | Facility: CLINIC | Age: 37
End: 2017-12-08

## 2017-12-08 VITALS — HEIGHT: 64 IN | WEIGHT: 164.4 LBS | BODY MASS INDEX: 28.07 KG/M2

## 2017-12-08 DIAGNOSIS — M51.369 ANNULAR TEAR OF LUMBAR DISC: Primary | ICD-10-CM

## 2017-12-08 ASSESSMENT — ENCOUNTER SYMPTOMS
WEIGHT LOSS: 0
FEVER: 0
BACK PAIN: 1
PANIC: 1
NECK MASS: 0
POLYDIPSIA: 0
JOINT SWELLING: 0
HALLUCINATIONS: 0
ORTHOPNEA: 0
POLYPHAGIA: 0
HYPERTENSION: 0
DECREASED CONCENTRATION: 1
TROUBLE SWALLOWING: 0
DEPRESSION: 1
SLEEP DISTURBANCES DUE TO BREATHING: 0
HYPOTENSION: 0
MEMORY LOSS: 0
SPEECH CHANGE: 0
ALTERED TEMPERATURE REGULATION: 0
HOARSE VOICE: 0
CHILLS: 0
ARTHRALGIAS: 1
LIGHT-HEADEDNESS: 1
LOSS OF CONSCIOUSNESS: 0
PALPITATIONS: 1
INCREASED ENERGY: 1
FATIGUE: 1
NUMBNESS: 1
PARALYSIS: 0
NIGHT SWEATS: 1
STIFFNESS: 1
WEAKNESS: 1
SINUS PAIN: 0
DECREASED LIBIDO: 0
HEADACHES: 1
NERVOUS/ANXIOUS: 1
TASTE DISTURBANCE: 0
EXERCISE INTOLERANCE: 1
DISTURBANCES IN COORDINATION: 0
SINUS CONGESTION: 0
HOT FLASHES: 0
TINGLING: 1
WEIGHT GAIN: 1
SORE THROAT: 0
NECK PAIN: 1
DECREASED APPETITE: 1
SMELL DISTURBANCE: 0
DIZZINESS: 1
SYNCOPE: 0
LEG PAIN: 1
MUSCLE CRAMPS: 1
SEIZURES: 0
MUSCLE WEAKNESS: 1
INSOMNIA: 1
MYALGIAS: 1
TREMORS: 1

## 2017-12-08 NOTE — NURSING NOTE
"Reason For Visit:   Chief Complaint   Patient presents with     Back Pain     pt states she had her M.R.I done and would like to see if the results show that she needs to move forward in discussing surgery.       Primary MD:       Occupation no  Currently working? No.    Smoker: No  Request smoking cessation information: No    Ht 1.63 m (5' 4.17\")  Wt 74.6 kg (164 lb 6.4 oz)  LMP 10/26/2017 (Approximate)  BMI 28.07 kg/m2    Pain Assessment  Patient Currently in Pain: Yes  0-10 Pain Scale: 8  Primary Pain Location: Back  Pain Orientation: Right, Lower  Alleviating Factors:  (no alleviating factors)  Aggravating Factors: Bending, Standing, Walking, Sitting    Oswestry (ANA LAURA) Questionnaire    OSWESTRY DISABILITY INDEX 12/8/2017   Section 1 - Pain intensity 4   Section 2 - Personal care (washing, dressing, etc.)  2   Section 3 - Lifting 4   Section 4 - Walking 2   Section 5 - Sitting 4   Section 6 - Standing 4   Section 7 - Sleeping 4   Section 8 - Sex life (if applicable) 3   Section 9 - Social life 4   Section 10 - Traveling 4   Count 10   Sum 35   Oswestry Score (%) 70   SECTION 1-PAIN INTENSITY The pain is very severe at the moment.   SECTION 2-PERSONAL CARE (WASHING,DRESSING,ETC.) It is painful to look after myself and I am slow and careful.   SECTION 3-LIFTING I can only lift very light weights.   SECTION 4-WALKING Pain prevents me from walking more than a quarter of a mile.   SECTION 5-SITTING Pain prevents me from sitting for more than 10 minutes.   SECTION 6-STANDING Pain prevents me from standing for more than 10 minutes.   SECTION 7-SLEEPING Because of pain I have less than 2 hours sleep.   SECTION 8-SEX LIFE (IF APPLICABLE) My sex life is severely restricted by pain.   SECTION 9-SOCIAL LIFE Pain has restricted my social life to home.   SECTION 10-TRAVELING Pain restricts me to short necessary trips of under 30 minutes.   Oswestry Disability Index: Count 10   ANA LAURA: Total Score = SUM (total for " answered questions) 35   Computed Oswestry Score 70 (%)   Some recent data might be hidden        Visual Analog Pain Scale  Back Pain Scale 0-10: 8.5  Right leg pain: 5  Left leg pain: 3.5    Promis 10 Assessment    PROMIS 10 12/8/2017   In general, would you say your health is: Good   In general, would you say your quality of life is: Poor   In general, how would you rate your physical health? Poor   In general, how would you rate your mental health, including your mood and your ability to think? Fair   In general, how would you rate your satisfaction with your social activities and relationships? Poor   In general, please rate how well you carry out your usual social activities and roles Poor   To what extent are you able to carry out your everyday physical activities such as walking, climbing stairs, carrying groceries, or moving a chair? A little   How often have you been bothered by emotional problems such as feeling anxious, depressed or irritable? Always   How would you rate your fatigue on average? Very severe   How would you rate your pain on average?   0 = No Pain  to  10 = Worst Imaginable Pain 8   In general, would you say your health is: 3   In general, would you say your quality of life is: 1   In general, how would you rate your physical health? 1   In general, how would you rate your mental health, including your mood and your ability to think? 2   In general, how would you rate your satisfaction with your social activities and relationships? 1   In general, please rate how well you carry out your usual social activities and roles. (This includes activities at home, at work and in your community, and responsibilities as a parent, child, spouse, employee, friend, etc.) 1   To what extent are you able to carry out your everyday physical activities such as walking, climbing stairs, carrying groceries, or moving a chair? 2   In the past 7 days, how often have you been bothered by emotional problems such  as feeling anxious, depressed, or irritable? 5   In the past 7 days, how would you rate your fatigue on average? 5   In the past 7 days, how would you rate your pain on average, where 0 means no pain, and 10 means worst imaginable pain? 8   Global Mental Health Score 5   Global Physical Health Score 6   PROMIS TOTAL - SUBSCORES 11   Some recent data might be hidden        Karla Wall CMA

## 2017-12-08 NOTE — MR AVS SNAPSHOT
After Visit Summary   12/8/2017    Gloria Benjamin    MRN: [id number]           Patient Information     Date Of Birth          1980        Visit Information        Provider Department      12/8/2017 8:00 AM Fuad Bolaños MD [facility]        Today's Diagnoses     Annular tear of lumbar disc    -  1       Follow-ups after your visit        Additional Services     [facility]ERR[facility]       Please call [phone] to make an appointment. Clinic is located: [facility] [address] [place] [address] [id number] [place] [address]      Please complete the following questions:    Procedure/Referral: Procedure Only -  Procedure or injection only - please call the [facility] at [phone] to schedule: Epidural Steroid (transforaminal approach): Right side Lumbar 5 - Sacral 1    What is your diagnosis for the patient's pain? L5-S1 annular tear disc bulge     What are your specific questions for the pain specialist? none    Are there any red flags that may impact the assessment or management of the patient? None                  Who to contact     Please call your clinic at [phone] to:    Ask questions about your health    Make or cancel appointments    Discuss your medicines    Learn about your test results    Speak to your doctor   If you have compliments or concerns about an experience at your clinic, or if you wish to file a complaint, please contact [facility] Patient Relations at [phone] or email us at [email or web address]         Additional Information About Your Visit        MyChart Information     [employer] gives you secure access to your electronic health record. If you see a primary care provider, you can also send messages to your care team and make appointments. If you have questions, please call your primary care clinic.  If you do not have a primary care provider, please  "call [phone] and they will assist you.      [employer] is an electronic gateway that provides easy, online access to your medical records. With [employer], you can request a clinic appointment, read your test results, renew a prescription or communicate with your care team.     To access your existing account, please contact your [facility] or call [phone] for assistance.        Care EveryWhere ID     This is your Care EveryWhere ID. This could be used by other organizations to access your [facility] medical records  [id number]        Your Vitals Were     Height Last Period BMI (Body Mass Index)             1.63 m (5' 4.17\") 10/26/2017 (Approximate) 28.07 kg/m2          Blood Pressure from Last 3 Encounters:   11/09/17 113/80   10/24/17 118/77   05/31/17 118/74    Weight from Last 3 Encounters:   12/08/17 74.6 kg (164 lb 6.4 oz)   11/09/17 73.6 kg (162 lb 4.8 oz)   10/24/17 73.9 kg (163 lb)              We Performed the Following     [facility] REFERRAL        Primary Care Provider Fax #    Physician No Ref-Primary [phone]       No address on file        Equal Access to Services     ANUJA SALVADOR : Kosta Schneider, waradhada glo, qaybta kaalmada adelubayada, jennifer workman. So [facility] [phone].    ATENCIÓN: Si habla español, tiene a rangel disposición servicios gratuitos de asistencia lingüística. Llame al [phone].    We comply with applicable federal civil rights laws and [place] laws. We do not discriminate on the basis of race, color, national origin, age, disability, sex, sexual orientation, or gender identity.            Thank you!     Thank you for choosing [facility]  for your care. Our goal is always to provide you with excellent care. Hearing back from our patients is one way we can continue to improve our services. Please take a few minutes to complete the written survey that you " may receive in the mail after your visit with us. Thank you!             Your Updated Medication List - Protect others around you: Learn how to safely use, store and throw away your medicines at www.disposemymeds.org.          This list is accurate as of: 12/8/17  5:15 PM.  Always use your most recent med list.                   Brand Name Dispense Instructions for use Diagnosis    cyclobenzaprine 5 MG tablet    FLEXERIL     Take 5 mg by mouth 2 times daily as needed for muscle spasms        HYDROcodone-acetaminophen 5-325 MG per tablet    NORCO    18 tablet    Take 1 tablet by mouth every 6 hours as needed for moderate to severe pain    Chronic right-sided low back pain with right-sided sciatica       IMITREX PO      Take by mouth every 8 hours as needed for migraine        methylPREDNISolone 4 MG tablet    MEDROL DOSEPAK    21 tablet    Follow package instructions    Acute right-sided low back pain with right-sided sciatica       nicotine 21 MG/24HR 24 hr patch    NICODERM CQ    30 patch    Place 1 patch onto the skin every 24 hours    Encounter for smoking cessation counseling       RESTASIS 0.05 % ophthalmic emulsion   Generic drug:  cycloSPORINE

## 2017-12-08 NOTE — LETTER
12/8/2017       RE: Gloria Benjamin  [address]  [facility] [address]     Dear Colleague,    Thank you for referring your patient, Gloria Benjamin, to the [facility] at [facility]. Please see a copy of my visit note below.    Spine Surgery Consultation    REFERRING PHYSICIAN: Carli Jesus   PRIMARY CARE PHYSICIAN: No Ref-Primary, Physician           Chief Complaint:   Back Pain (pt states she had her M.R.I done and would like to see if the results show that she needs to move forward in discussing surgery.)      History of Present Illness:  Symptom Profile Including: location of symptoms, onset, severity, exacerbating/alleviating factors, previous treatments:        Gloria Benjamin is a 37 year old female who is referred for evaluation of both axial neck pain as well as axial low back pain and right greater than left radicular symptoms. The radicular symptoms are primarily in an S1 distribution. She also has quite severe low back pain. Her symptoms of an going on for 8 months but are worsening over the last 2 months. Her low back pain starts on the right side and is an aching burning pain going down into the bottom of the foot. Sometimes also goes on to the left side. It is about 90% back pain and only 10% into the leg. She has done physical therapy with core strengthening and also doing quite a bit of neck range of motion exercises. She has been taking hydrocodone for 10 days. She previously tried Aleve and Flexeril. She is on disability because of her long-standing pain symptoms.    She has endometriosis. She is a one pack per day smoker. She was previously an alcoholic but quit smoking 9 months ago.         Past Medical History:     Past Medical History:   Diagnosis Date     Abnormal Pap smear of cervix date unknown    Pt reported, abnormal pap in late teens, results unknown.     Substance abuse             Past Surgical History:     Past Surgical History:  "  Procedure Laterality Date     APPENDECTOMY       GYN SURGERY      Ovarian cyst removed            Social History:     Social History   Substance Use Topics     Smoking status: Current Every Day Smoker     Packs/day: 1.00     Types: Cigarettes     Smokeless tobacco: Never Used     Alcohol use No      Comment: sober since 7 months 3/2017             Family History:   No family history on file.         Allergies:     Allergies   Allergen Reactions     Penicillin G Hives            Medications:     Current Outpatient Prescriptions   Medication     RESTASIS 0.05 % ophthalmic emulsion     SUMAtriptan Succinate (IMITREX PO)     HYDROcodone-acetaminophen (NORCO) 5-325 MG per tablet     cyclobenzaprine (FLEXERIL) 5 MG tablet     methylPREDNISolone (MEDROL DOSEPAK) 4 MG tablet     nicotine (NICODERM CQ) 21 MG/24HR 24 hr patch     No current facility-administered medications for this visit.              Review of Systems:     A 10 point ROS was performed and reviewed. Specific responses to these questions are noted at the end of the document.         Physical Exam:   Vitals: Ht 1.63 m (5' 4.17\")  Wt 74.6 kg (164 lb 6.4 oz)  LMP 10/26/2017 (Approximate)  BMI 28.07 kg/m2  Constitutional: awake, alert, cooperative, no apparent distress, appears stated age.    Eyes: The sclera are white.  Ears, Nose, Throat: The trachea is midline.  Psychiatric: The patient has a normal affect.  Respiratory: breathing non-labored  Cardiovascular: The extremities are warm and perfused.  Skin: no obvious rashes or lesions.  Musculoskeletal, Neurologic, and Spine:   The bilateral lower extremities are examined. 5 out of 5 in hip flexion, knee extension, knee flexion, tibialis anterior. 45 extensor hallux longus. Right hamstrings is also 4 out of 5. 5 out of 5 and peroneal muscles. Intact sensation L3 to S1. +2 patellar and Achilles reflexes. Mildly positive right straight leg raise. Negative on the left. No Petr sign. No clonus. I cannot " elicit a Babinski sign. She is observed to walk with an antalgic gait.         Imaging:   We ordered and independently reviewed new radiographs at this clinic visit. The results were discussed with the patient.  Findings include:    AP and Lateral Flexion and Extension Lumbar Radiographs Ordered and Reviewed Today: December 8, 2017. No obvious instability. Mild spondylosis worse at L5-S1 with loss of disc space height.    November 24, 2017 lumbar MRI: Annular tear at L5-S1 with broad-based disc bulge resulting in mild to moderate lateral recess stenosis. No foraminal stenosis.           Assessment and Plan:   Assessment:  37 year old female with axial low back pain and right S1 radicular symptoms. It is about 90% back pain. She is also on hydrocodone and is a heavy smoker.     Plan:  1. Right L5-S1 transforaminal epidural steroid injection.  2. I recommended smoking cessation. I explained that prior to any elective procedure she would need to be completely nicotine free. I did offer Chantix prescription. She is going to think about it. If she would like to have this she'll let our office know.  3. Continue her core strengthening and physical therapy regimen and continue oral anti-inflammatories. I recommended against further opioid use.  4. Follow-up in one month to assess her progress.  5. I focused this visit on her lumbar problem. I did briefly review her MRI scan which does show a C5 hemangioma. Radiology has recommended a repeat cervical MRI for this to monitor the area. I do not see anything that would be amenable to surgery in the cervical spine at this time. I recommended continued conservative pair for her axial neck pain.      Again, thank you for allowing me to participate in the care of your patient.      Sincerely,    Fuad Bolaños MD

## 2017-12-08 NOTE — PROGRESS NOTES
Spine Surgery Consultation    REFERRING PHYSICIAN: Carli Jesus   PRIMARY CARE PHYSICIAN: No Ref-Primary, Physician           Chief Complaint:   Back Pain (pt states she had her M.R.I done and would like to see if the results show that she needs to move forward in discussing surgery.)      History of Present Illness:  Symptom Profile Including: location of symptoms, onset, severity, exacerbating/alleviating factors, previous treatments:        Gloria Benjamin is a 37 year old female who is referred for evaluation of both axial neck pain as well as axial low back pain and right greater than left radicular symptoms. The radicular symptoms are primarily in an S1 distribution. She also has quite severe low back pain. Her symptoms of an going on for 8 months but are worsening over the last 2 months. Her low back pain starts on the right side and is an aching burning pain going down into the bottom of the foot. Sometimes also goes on to the left side. It is about 90% back pain and only 10% into the leg. She has done physical therapy with core strengthening and also doing quite a bit of neck range of motion exercises. She has been taking hydrocodone for 10 days. She previously tried Aleve and Flexeril. She is on disability because of her long-standing pain symptoms.    She has endometriosis. She is a one pack per day smoker. She was previously an alcoholic but quit smoking 9 months ago.         Past Medical History:     Past Medical History:   Diagnosis Date     Abnormal Pap smear of cervix date unknown    Pt reported, abnormal pap in late teens, results unknown.     Substance abuse             Past Surgical History:     Past Surgical History:   Procedure Laterality Date     APPENDECTOMY       GYN SURGERY      Ovarian cyst removed            Social History:     Social History   Substance Use Topics     Smoking status: Current Every Day Smoker     Packs/day: 1.00     Types: Cigarettes     Smokeless tobacco: Never Used      "Alcohol use No      Comment: sober since 7 months 3/2017             Family History:   No family history on file.         Allergies:     Allergies   Allergen Reactions     Penicillin G Hives            Medications:     Current Outpatient Prescriptions   Medication     RESTASIS 0.05 % ophthalmic emulsion     SUMAtriptan Succinate (IMITREX PO)     HYDROcodone-acetaminophen (NORCO) 5-325 MG per tablet     cyclobenzaprine (FLEXERIL) 5 MG tablet     methylPREDNISolone (MEDROL DOSEPAK) 4 MG tablet     nicotine (NICODERM CQ) 21 MG/24HR 24 hr patch     No current facility-administered medications for this visit.              Review of Systems:     A 10 point ROS was performed and reviewed. Specific responses to these questions are noted at the end of the document.         Physical Exam:   Vitals: Ht 1.63 m (5' 4.17\")  Wt 74.6 kg (164 lb 6.4 oz)  LMP 10/26/2017 (Approximate)  BMI 28.07 kg/m2  Constitutional: awake, alert, cooperative, no apparent distress, appears stated age.    Eyes: The sclera are white.  Ears, Nose, Throat: The trachea is midline.  Psychiatric: The patient has a normal affect.  Respiratory: breathing non-labored  Cardiovascular: The extremities are warm and perfused.  Skin: no obvious rashes or lesions.  Musculoskeletal, Neurologic, and Spine:   The bilateral lower extremities are examined. 5 out of 5 in hip flexion, knee extension, knee flexion, tibialis anterior. 45 extensor hallux longus. Right hamstrings is also 4 out of 5. 5 out of 5 and peroneal muscles. Intact sensation L3 to S1. +2 patellar and Achilles reflexes. Mildly positive right straight leg raise. Negative on the left. No Petr sign. No clonus. I cannot elicit a Babinski sign. She is observed to walk with an antalgic gait.         Imaging:   We ordered and independently reviewed new radiographs at this clinic visit. The results were discussed with the patient.  Findings include:    AP and Lateral Flexion and Extension Lumbar " Radiographs Ordered and Reviewed Today: December 8, 2017. No obvious instability. Mild spondylosis worse at L5-S1 with loss of disc space height.    November 24, 2017 lumbar MRI: Annular tear at L5-S1 with broad-based disc bulge resulting in mild to moderate lateral recess stenosis. No foraminal stenosis.           Assessment and Plan:   Assessment:  37 year old female with axial low back pain and right S1 radicular symptoms. It is about 90% back pain. She is also on hydrocodone and is a heavy smoker.     Plan:  1. Right L5-S1 transforaminal epidural steroid injection.  2. I recommended smoking cessation. I explained that prior to any elective procedure she would need to be completely nicotine free. I did offer Chantix prescription. She is going to think about it. If she would like to have this she'll let our office know.  3. Continue her core strengthening and physical therapy regimen and continue oral anti-inflammatories. I recommended against further opioid use.  4. Follow-up in one month to assess her progress.  5. I focused this visit on her lumbar problem. I did briefly review her MRI scan which does show a C5 hemangioma. Radiology has recommended a repeat cervical MRI for this to monitor the area. I do not see anything that would be amenable to surgery in the cervical spine at this time. I recommended continued conservative pair for her axial neck pain.      Respectfully,  Fuad Bolaños MD  Spine Surgery  [facility]      Answers for HPI/ROS submitted by the patient on 12/8/2017   General Symptoms: Yes  Skin Symptoms: No  HENT Symptoms: Yes  EYE SYMPTOMS: No  HEART SYMPTOMS: Yes  LUNG SYMPTOMS: No  INTESTINAL SYMPTOMS: No  URINARY SYMPTOMS: No  GYNECOLOGIC SYMPTOMS: Yes  BREAST SYMPTOMS: No  SKELETAL SYMPTOMS: Yes  BLOOD SYMPTOMS: No  NERVOUS SYSTEM SYMPTOMS: Yes  MENTAL HEALTH SYMPTOMS: Yes  Fever: No  Loss of appetite: Yes  Weight loss: No  Weight gain: Yes  Fatigue: Yes  Night sweats:  Yes  Chills: No  Increased stress: Yes  Excessive hunger: No  Excessive thirst: No  Feeling hot or cold when others believe the temperature is normal: No  Loss of height: Yes  Post-operative complications: No  Surgical site pain: No  Hallucinations: No  Change in or Loss of Energy: Yes  Hyperactivity: No  Confusion: No  Ear pain: No  Ear discharge: No  Hearing loss: No  Tinnitus: Yes  Nosebleeds: No  Congestion: No  Sinus pain: No  Trouble swallowing: No   Voice hoarseness: No  Mouth sores: No  Sore throat: No  Tooth pain: No  Gum tenderness: No  Bleeding gums: Yes  Change in taste: No  Change in sense of smell: No  Dry mouth: No  Hearing aid used: No  Neck lump: No  Chest pain or pressure: No  Fast or irregular heartbeat: Yes  Pain in legs with walking: Yes  Trouble breathing while lying down: No  Fingers or toes appear blue: No  High blood pressure: No  Low blood pressure: No  Fainting: No  Murmurs: No  Pacemaker: No  Varicose veins: No  Edema or swelling: No  Wake up at night with shortness of breath: No  Light-headedness: Yes  Exercise intolerance: Yes  Back pain: Yes  Muscle aches: Yes  Neck pain: Yes  Swollen joints: No  Joint pain: Yes  Bone pain: Yes  Muscle cramps: Yes  Muscle weakness: Yes  Joint stiffness: Yes  Bone fracture: No  Trouble with coordination: No  Dizziness or trouble with balance: Yes  Fainting or black-out spells: No  Memory loss: No  Headache: Yes  Seizures: No  Speech problems: No  Tingling: Yes  Tremor: Yes  Weakness: Yes  Difficulty walking: Yes  Paralysis: No  Numbness: Yes  Bleeding or spotting between periods: No  Heavy or painful periods: Yes  Irregular periods: Yes  Vaginal discharge: No  Hot flashes: No  Vaginal dryness: No  Genital ulcers: No  Reduced libido: No  Painful intercourse: Yes  Difficulty with sexual arousal: No  Post-menopausal bleeding: No  Nervous or Anxious: Yes  Depression: Yes  Trouble sleeping: Yes  Trouble thinking or concentrating: Yes  Mood changes:  Yes  Panic attacks: Yes

## 2017-12-11 DIAGNOSIS — M54.41 ACUTE RIGHT-SIDED LOW BACK PAIN WITH RIGHT-SIDED SCIATICA: Primary | ICD-10-CM

## 2017-12-13 ENCOUNTER — TELEPHONE (OUTPATIENT)
Dept: ANESTHESIOLOGY | Facility: CLINIC | Age: 37
End: 2017-12-13

## 2017-12-13 NOTE — TELEPHONE ENCOUNTER
I spoke with Shirin ([facility]) regarding the MRI and this patient, her pain, etc.  Shirin has made an appointment for her and will call the patient and follow up.

## 2017-12-13 NOTE — TELEPHONE ENCOUNTER
Voice message left for pt to check My Chart for pre-procedure instructions.  Clinic number left for pt to call if needed.     Meme Patel RN, BSN

## 2017-12-22 ENCOUNTER — SURGERY (OUTPATIENT)
Age: 37
End: 2017-12-22

## 2017-12-22 ENCOUNTER — HOSPITAL ENCOUNTER (OUTPATIENT)
Facility: AMBULATORY SURGERY CENTER | Age: 37
End: 2017-12-22
Attending: ANESTHESIOLOGY
Payer: COMMERCIAL

## 2017-12-22 ENCOUNTER — RADIANT APPOINTMENT (OUTPATIENT)
Dept: RADIOLOGY | Facility: AMBULATORY SURGERY CENTER | Age: 37
End: 2017-12-22
Attending: ANESTHESIOLOGY
Payer: COMMERCIAL

## 2017-12-22 VITALS
SYSTOLIC BLOOD PRESSURE: 136 MMHG | TEMPERATURE: 97.6 F | WEIGHT: 155 LBS | RESPIRATION RATE: 14 BRPM | HEIGHT: 63 IN | DIASTOLIC BLOOD PRESSURE: 80 MMHG | BODY MASS INDEX: 27.46 KG/M2 | HEART RATE: 79 BPM | OXYGEN SATURATION: 99 %

## 2017-12-22 DIAGNOSIS — R52 PAIN: ICD-10-CM

## 2017-12-22 LAB
HCG UR QL: NEGATIVE
INTERNAL QC OK POCT: YES

## 2017-12-22 RX ORDER — IOPAMIDOL 408 MG/ML
INJECTION, SOLUTION INTRATHECAL PRN
Status: DISCONTINUED | OUTPATIENT
Start: 2017-12-22 | End: 2017-12-22 | Stop reason: HOSPADM

## 2017-12-22 RX ORDER — BUPIVACAINE HYDROCHLORIDE 2.5 MG/ML
INJECTION, SOLUTION EPIDURAL; INFILTRATION; INTRACAUDAL PRN
Status: DISCONTINUED | OUTPATIENT
Start: 2017-12-22 | End: 2017-12-22 | Stop reason: HOSPADM

## 2017-12-22 RX ORDER — DEXAMETHASONE SODIUM PHOSPHATE 4 MG/ML
INJECTION, SOLUTION INTRA-ARTICULAR; INTRALESIONAL; INTRAMUSCULAR; INTRAVENOUS; SOFT TISSUE PRN
Status: DISCONTINUED | OUTPATIENT
Start: 2017-12-22 | End: 2017-12-22 | Stop reason: HOSPADM

## 2017-12-22 RX ORDER — LIDOCAINE HYDROCHLORIDE 10 MG/ML
INJECTION, SOLUTION EPIDURAL; INFILTRATION; INTRACAUDAL; PERINEURAL PRN
Status: DISCONTINUED | OUTPATIENT
Start: 2017-12-22 | End: 2017-12-22 | Stop reason: HOSPADM

## 2017-12-22 RX ADMIN — DEXAMETHASONE SODIUM PHOSPHATE 1.5 ML: 4 INJECTION, SOLUTION INTRA-ARTICULAR; INTRALESIONAL; INTRAMUSCULAR; INTRAVENOUS; SOFT TISSUE at 12:10

## 2017-12-22 RX ADMIN — LIDOCAINE HYDROCHLORIDE 10 ML: 10 INJECTION, SOLUTION EPIDURAL; INFILTRATION; INTRACAUDAL; PERINEURAL at 12:18

## 2017-12-22 RX ADMIN — IOPAMIDOL 3 ML: 408 INJECTION, SOLUTION INTRATHECAL at 12:15

## 2017-12-22 RX ADMIN — BUPIVACAINE HYDROCHLORIDE 2 ML: 2.5 INJECTION, SOLUTION EPIDURAL; INFILTRATION; INTRACAUDAL at 12:10

## 2017-12-22 RX ADMIN — LIDOCAINE HYDROCHLORIDE 10 ML: 10 INJECTION, SOLUTION EPIDURAL; INFILTRATION; INTRACAUDAL; PERINEURAL at 12:15

## 2017-12-22 NOTE — IP AVS SNAPSHOT
MRN:[id number]                      After Visit Summary   12/22/2017    Gloria Benjamin    MRN: [id number]           Thank you!     Thank you for choosing [facility] for your care. Our goal is always to provide you with excellent care. Hearing back from our patients is one way we can continue to improve our services. Please take a few minutes to complete the written survey that you may receive in the mail after you visit with us. Thank you!        Patient Information     Date Of Birth          1980        About your hospital stay     You were admitted on:  December 22, 2017 [facility]    You were discharged on:  December 22, 2017       Who to Call     For medical emergencies, please call 911.  For non-urgent questions about your medical care, please call your primary care provider or clinic, None  For questions related to your surgery, please call your surgery clinic        Attending Provider     Provider Anibal Ashford MD Anesthesiology       Primary Care Provider Fax #    Physician No Ref-Primary [phone]      Further instructions from your care team       Home Care Instructions after an Epidural Steroid Pain Injection    A lumbar epidural steroid injection delivers steroid medication directly into the area that may be causing your lower back pain and/or leg pain. A cervical or thoracic epidural steroid injection delivers steroids into the epidural space surrounding spinal nerve roots to help alleviate pain in the upper spine/neck.    Activity  -Rest today  -Do not work today  -Resume normal activity tomorrow  -DO NOT shower for 24 hours  -DO NOT remove bandaid for 24 hours    Pain  -You may experience soreness at the injection site for one or two days  -You may use an ice pack for 20 minutes every 2 hours for the first 24 hours  -You may use a heating pad after the first 24 hours  -You may use Tylenol (acetaminophen)  every 4 hours or other pain medicines as     directed by your physician    You may experience numbness radiating into your legs or arms (depending on the procedure location). This numbness may last several hours. Until sensation returns to normal; please use caution in walking, climbing stairs, and stepping out of your vehicle, etc.    DID YOU RECEIVE SEDATION TODAY?  No    Safety  Sedation medicine, if given, may remain active for many hours. It is important for the next 24 hours that you do not:  -Drive a car  -Operate machines or power tools  -Consume alcohol, including beer  -Sign any important papers or legal documents      Common side effects of steroids:  Not everyone will experience corticosteroid side effects. If side effects are experienced, they will gradually subside in the 7-10 day period following an injection. Most common side effects include:  -Flushed face and/or chest  -Feeling of warmth, particularly in the face but could be an overall feeling of warmth  -Increased blood sugar in diabetic patients  -Menstrual irregularities my occur. If taking hormone-based birth control an alternate method of birth control is recommended  -Sleep disturbances and/or mood swings are possible  -Leg cramps    Please contact us if you have:  -Severe pain  -Fever more than 101.5 degrees Fahrenheit  -Signs of infection at the injection site (redness, swelling, or drainage)    If you have questions, please contact our office at [phone] between the hours of 7:00 am and 3:00 pm Monday through Friday. After office hours you can contact the on call provider by dialing [phone]. If you need immediate attention, we recommend that you go to a hospital emergency room or dial 101.      Pending Results     No orders found from 12/20/2017 to 12/23/2017.            Admission Information     Date & Time Provider Department Dept. Phone    12/22/2017 Anibal Kinney MD [facility] [phone]     "  Your Vitals Were     Blood Pressure Pulse Temperature Respirations Height Weight    136/80 79 97.6  F (36.4  C) (Temporal) 14 1.6 m (5' 3\") 70.3 kg (155 lb)    Pulse Oximetry BMI (Body Mass Index)                99% 27.46 kg/m2          MyChart Information     [employer] gives you secure access to your electronic health record. If you see a primary care provider, you can also send messages to your care team and make appointments. If you have questions, please call your primary care clinic.  If you do not have a primary care provider, please call [phone] and they will assist you.      [employer] is an electronic gateway that provides easy, online access to your medical records. With [employer], you can request a clinic appointment, read your test results, renew a prescription or communicate with your care team.     To access your existing account, please contact your [facility] or call [phone] for assistance.        Care EveryWhere ID     This is your Care EveryWhere ID. This could be used by other organizations to access your [facility] medical records  [id number]        Equal Access to Services     ANUJA SALVADOR : Hadii sam bernabe Soaris, waaxda lurhonda, qaybta kaalmaguzman candelario, jennifer workman. So [facility] [phone].    ATENCIÓN: Si habla español, tiene a rangel disposición servicios gratuitos de asistencia lingüística. Humberto al [phone].    We comply with applicable federal civil rights laws and [place] laws. We do not discriminate on the basis of race, color, national origin, age, disability, sex, sexual orientation, or gender identity.               Review of your medicines      UNREVIEWED medicines. Ask your doctor about these medicines        Dose / Directions    cyclobenzaprine 5 MG tablet   Commonly known as:  FLEXERIL        Dose:  5 mg   Take 5 mg by mouth 2 times daily as needed for muscle spasms   Refills:  0       " HYDROcodone-acetaminophen 5-325 MG per tablet   Commonly known as:  NORCO   Used for:  Chronic right-sided low back pain with right-sided sciatica        Dose:  1 tablet   Take 1 tablet by mouth every 6 hours as needed for moderate to severe pain   Quantity:  18 tablet   Refills:  0       IMITREX PO        Take by mouth every 8 hours as needed for migraine   Refills:  0       methylPREDNISolone 4 MG tablet   Commonly known as:  MEDROL DOSEPAK   Used for:  Acute right-sided low back pain with right-sided sciatica        Follow package instructions   Quantity:  21 tablet   Refills:  0       nicotine 21 MG/24HR 24 hr patch   Commonly known as:  NICODERM CQ   Used for:  Encounter for smoking cessation counseling        Dose:  1 patch   Place 1 patch onto the skin every 24 hours   Quantity:  30 patch   Refills:  0       RESTASIS 0.05 % ophthalmic emulsion   Generic drug:  cycloSPORINE        Refills:  2                Protect others around you: Learn how to safely use, store and throw away your medicines at www.disposemymeds.org.             Medication List: This is a list of all your medications and when to take them. Check marks below indicate your daily home schedule. Keep this list as a reference.      Medications           Morning Afternoon Evening Bedtime As Needed    cyclobenzaprine 5 MG tablet   Commonly known as:  FLEXERIL   Take 5 mg by mouth 2 times daily as needed for muscle spasms                                HYDROcodone-acetaminophen 5-325 MG per tablet   Commonly known as:  NORCO   Take 1 tablet by mouth every 6 hours as needed for moderate to severe pain                                IMITREX PO   Take by mouth every 8 hours as needed for migraine                                methylPREDNISolone 4 MG tablet   Commonly known as:  MEDROL DOSEPAK   Follow package instructions                                nicotine 21 MG/24HR 24 hr patch   Commonly known as:  NICODERM CQ   Place 1 patch onto the skin  every 24 hours                                RESTASIS 0.05 % ophthalmic emulsion   Generic drug:  cycloSPORINE

## 2017-12-22 NOTE — IP AVS SNAPSHOT
[facility]    [address] [place] [id number]    Phone:  [phone]    Fax:  [phone]                                       After Visit Summary   12/22/2017    Gloria Benjamin    MRN: [id number]           After Visit Summary Signature Page     I have received my discharge instructions, and my questions have been answered. I have discussed any challenges I see with this plan with the nurse or doctor.    ..........................................................................................................................................  Patient/Patient Representative Signature      ..........................................................................................................................................  Patient Representative Print Name and Relationship to Patient    ..................................................               ................................................  Date                                            Time    ..........................................................................................................................................  Reviewed by Signature/Title    ...................................................              ..............................................  Date                                                            Time

## 2017-12-22 NOTE — DISCHARGE INSTRUCTIONS
Home Care Instructions after an Epidural Steroid Pain Injection    A lumbar epidural steroid injection delivers steroid medication directly into the area that may be causing your lower back pain and/or leg pain. A cervical or thoracic epidural steroid injection delivers steroids into the epidural space surrounding spinal nerve roots to help alleviate pain in the upper spine/neck.    Activity  -Rest today  -Do not work today  -Resume normal activity tomorrow  -DO NOT shower for 24 hours  -DO NOT remove bandaid for 24 hours    Pain  -You may experience soreness at the injection site for one or two days  -You may use an ice pack for 20 minutes every 2 hours for the first 24 hours  -You may use a heating pad after the first 24 hours  -You may use Tylenol (acetaminophen) every 4 hours or other pain medicines as     directed by your physician    You may experience numbness radiating into your legs or arms (depending on the procedure location). This numbness may last several hours. Until sensation returns to normal; please use caution in walking, climbing stairs, and stepping out of your vehicle, etc.    DID YOU RECEIVE SEDATION TODAY?  No    Safety  Sedation medicine, if given, may remain active for many hours. It is important for the next 24 hours that you do not:  -Drive a car  -Operate machines or power tools  -Consume alcohol, including beer  -Sign any important papers or legal documents      Common side effects of steroids:  Not everyone will experience corticosteroid side effects. If side effects are experienced, they will gradually subside in the 7-10 day period following an injection. Most common side effects include:  -Flushed face and/or chest  -Feeling of warmth, particularly in the face but could be an overall feeling of warmth  -Increased blood sugar in diabetic patients  -Menstrual irregularities my occur. If taking hormone-based birth control an alternate method of birth control is recommended  -Sleep  disturbances and/or mood swings are possible  -Leg cramps    Please contact us if you have:  -Severe pain  -Fever more than 101.5 degrees Fahrenheit  -Signs of infection at the injection site (redness, swelling, or drainage)    If you have questions, please contact our office at [phone] between the hours of 7:00 am and 3:00 pm Monday through Friday. After office hours you can contact the on call provider by dialing [phone]. If you need immediate attention, we recommend that you go to a hospital emergency room or dial [phone].

## 2017-12-22 NOTE — OP NOTE
Patient: Gloria Benjamin Age: 37 year old   MRN: [id number] Attending: Dr. Kinney     Date of Visit: December 22, 2017      PAIN MEDICINE CLINIC PROCEDURE NOTE    ATTENDING CLINICIAN:    Anibal Kinney MD    PREPROCEDURE DIAGNOSES:  1.  Lumbosacral radiculopathy  2.  Degenerative dis disease at L5/S1    POSTPROCEDURE DIAGNOSES:  1.  Lumbosacral radiculopathy  2.  Degenerative dis disease at L5/S1    PROCEDURE(S) PERFORMED:  1. Right L5-S1 transforaminal epidural steroid injection.  2.  Fluoroscopic guidance for the above-named procedure(s).      ANESTHESIA:  Local. See nursing notes for pre and post procedure vitals    BLOOD LOSS:  Minimal.    DRAINS AND SPECIMENS:  None.    COMPLICATIONS:  None.    INDICATIONS:  Gloria Benjamin is a 37 year old female with a history of low back pain with radicular symptoms to the lower extremity.  The patient stated that the patient was in their usual state of health and denied recent anticoagulant use or recent infections.  Therefore, the plan is to perform above mentioned procedure. Her recent MRI shows prominent disc bulge with multiple posterior annular fissures, superimposed central and tiny right subarticular disc protrusions at L5/s1. The right disc protrusion abuts the right descending S1 nerve root within the lateral recess. Slightly increased T2 hyperintensity and caliber of the right descending S1 likely secondary to compression.     Procedure Details:  The patient was met in the procedure room, where the patient was identified by name, medical record number and date of birth.  All of the patient s last minute questions were answered. Written informed consent was obtained and saved in the electronic medical record, after the risks, benefits, and alternatives were discussed with the patient.      A formal time-out procedure was performed, as per protocol, including patient name, title of procedure, and site of procedure, and all in the room concurred.  Routine monitors were  applied.      The patient was placed in the prone position on the procedure room table.  All pressure points were checked and comfortably padded.  Routine monitors were placed.  Vital signs were stable.    A chlorhexidine prep was completed followed by sterile draping per standard procedure.     The AP fluoroscopic view was optimized for approach at L5-S1 neural foramina. Targeting the 6 o'clock position of the pedicle, skin, and subcutaneous tissue overlying the region was anesthetized with 1% lidocaine using a 25-gauge 1-1/2 inch needle.  Then a 22-gauge 3-1/2 inch spinal needle was advanced through the anesthetized tract. Under serial fluoroscopic images, the needle are intermittently advanced until osseous contact was made at the 6 o'clock position of the pedicle.  The needle was then walked off in an inferior direction.  Using lateral fluoroscopic imaging, needle tip position was confirmed at the superior portion of the neural foramen.  Then after negative aspiration, 0.5 mL of the Omnipaque contrast was injected, confirming appropriate epidural and nerve root spread.  After negative aspiration, 2 mL of treatment solution containing 1 mL of bupivacaine 0.25% and 1 mL dexamethasone 10mg  per mL preservative-free was injected.  Light pressure was held at the puncture site(s) to prevent ecchymosis and oozing.  The patient's skin was cleansed, and hemostasis was confirmed.  Band-aids were applied to the needle injection site(s).      Condition:    The patient remained awake and alert throughout the procedure.  The patient tolerated the procedure well and was monitored for approximately 15 minutes afterward in the post procedure area.  There were no immediate post procedure complications noted.  The patient was then discharged to home as per protocol.  The patient will follow up in the outpatient clinic in 4 week(s), unless otherwise clinically indicated.      Pre-procedure pain score: 8/10  Post-procedure pain  score: 5/10

## 2018-01-11 ENCOUNTER — THERAPY VISIT (OUTPATIENT)
Dept: PHYSICAL THERAPY | Facility: CLINIC | Age: 38
End: 2018-01-11
Payer: COMMERCIAL

## 2018-01-11 DIAGNOSIS — M54.41 ACUTE RIGHT-SIDED LOW BACK PAIN WITH RIGHT-SIDED SCIATICA: ICD-10-CM

## 2018-01-11 DIAGNOSIS — M54.2 CERVICALGIA: ICD-10-CM

## 2018-01-11 PROCEDURE — 97112 NEUROMUSCULAR REEDUCATION: CPT | Mod: GP | Performed by: PHYSICAL THERAPIST

## 2018-01-11 PROCEDURE — 97530 THERAPEUTIC ACTIVITIES: CPT | Mod: GP | Performed by: PHYSICAL THERAPIST

## 2018-01-11 PROCEDURE — 97110 THERAPEUTIC EXERCISES: CPT | Mod: GP | Performed by: PHYSICAL THERAPIST

## 2018-01-11 NOTE — PROGRESS NOTES
Subjective:  HPI                    Objective:  System    Physical Exam    General     ROS    Assessment/Plan:    PROGRESS  REPORT    Progress reporting period is from 11/8/17 to 1/11/18.       SUBJECTIVE  Subjective: had epidural injection right after xmas for her low back. Initially got relief for 3 days but now it is wearing off. Feels pain with twisting/turning in shower, while sleeping/lying down. Feels she can in general move slightly better. Was trying some stretching the few days after the injection to try to move again, but not sure if she was doing a bad thing. Right low back is worse (left side developed from stretching). Pain and numbness can go down right leg into foot. In AM pain is generally ok, then pain gets worse as day goes on. 10 minutes into walking she needs to sit. Pain in low back gets to 6-7/10, but is starting to get increasingly worse again from injection. Notices that sitting in poor posture is worse, good posture is better. Meets with orthopedic surgeon tomorrow morning as follow up from injection, and possible surgery. For her neck, still bothers her. Notices more in AM. Massage from yesterday helped a lot. Doing the retractions always helps her extension ROM after.     Current Pain level: 4/10 (in R low back).     Initial Pain level:  (7/10 for neck, 8/10 for back).   Changes in function:  Yes (See Goal flowsheet attached for changes in current functional level)  Adverse reaction to treatment or activity: None    OBJECTIVE  Changes noted in objective findings:  Yes  Objective: tends to sit in poor to fair slouched posture with rounded shoulders and lumbar spine. baseline just R low back pain: lumbar AROM: flexion to ankles, incr pain in back. Extension 30% - midline LBP. KENIA - incr during/NW after/incr flexion ROM After. Prone lying: incr R low back and P into R leg. MARLON: centralize into low back initially, then slight periph into R low back. REIL: decr during (tightness), NB after  (Still R LBP after), but decr pain with flexion after. Cervical extension 60%, rotation 80%.     ASSESSMENT/PLAN  Updated problem list and treatment plan: Diagnosis 1:  cervical derangement    Pain -  hot/cold therapy, manual therapy, self management, education, directional preference exercise and home program  Decreased ROM/flexibility - manual therapy, therapeutic exercise and home program  Decreased strength - therapeutic exercise, therapeutic activities and home program  Decreased function - therapeutic activities and home program  Impaired posture - neuro re-education, therapeutic activities and home program  Diagnosis 2:  Right Lumbar Radiculopathy     Pain -  hot/cold therapy, manual therapy, self management, education, directional preference exercise and home program  Decreased ROM/flexibility - manual therapy, therapeutic exercise and home program  Decreased strength - therapeutic exercise, therapeutic activities and home program  Decreased function - therapeutic activities and home program  Impaired posture - neuro re-education, therapeutic activities and home program  STG/LTGs have been met or progress has been made towards goals:  Yes (See Goal flow sheet completed today.)  Assessment of Progress: The patient's condition is improving.  The patient's condition has potential to improve.  Self Management Plans:  Patient has been instructed in a home treatment program.  Patient  has been instructed in self management of symptoms.  I have re-evaluated this patient and find that the nature, scope, duration and intensity of the therapy is appropriate for the medical condition of the patient.  Gloria continues to require the following intervention to meet STG and LTG's:  PT    Recommendations:  This patient would benefit from continued therapy.     Frequency:  1 X week, once daily  Duration:  for 5 weeks          Please refer to the daily flowsheet for treatment today, total treatment time and time spent  performing 1:1 timed codes.

## 2018-01-11 NOTE — MR AVS SNAPSHOT
After Visit Summary   1/11/2018    Gloria Benjamin    MRN: [id number]           Patient Information     Date Of Birth          1980        Visit Information        Provider Department      1/11/2018 10:20 AM Yuly Cage PT [facility] [place]        Today's Diagnoses     Cervicalgia        Acute right-sided low back pain with right-sided sciatica           Follow-ups after your visit        Your next 10 appointments already scheduled     Jan 12, 2018  9:00 AM CST   (Arrive by 8:30 AM)   RETURN SPINE with Fuad Bolaños MD   [facility] ([facility])    [address] [place] [id number] [phone]              Who to contact     If you have questions or need follow up information about today's clinic visit or your schedule please contact [facility] directly at [phone].  Normal or non-critical lab and imaging results will be communicated to you by MyChart, letter or phone within 4 business days after the clinic has received the results. If you do not hear from us within 7 days, please contact the clinic through [employer] or phone. If you have a critical or abnormal lab result, we will notify you by phone as soon as possible.  Submit refill requests through [employer] or call your pharmacy and they will forward the refill request to us. Please allow 3 business days for your refill to be completed.          Additional Information About Your Visit        MyChart Information     [employer] gives you secure access to your electronic health record. If you see a primary care provider, you can also send messages to your care team and make appointments. If you have questions, please call your primary care clinic.  If you do not have a primary care provider, please call [phone] and they will assist you.        Care EveryWhere ID     This is your Care EveryWhere ID. This  could be used by other organizations to access your [place] medical records  [id number]         Blood Pressure from Last 3 Encounters:   12/22/17 136/80   11/09/17 113/80   10/24/17 118/77    Weight from Last 3 Encounters:   12/22/17 70.3 kg (155 lb)   12/08/17 74.6 kg (164 lb 6.4 oz)   11/09/17 73.6 kg (162 lb 4.8 oz)              We Performed the Following     GABBIE PROGRESS NOTES REPORT     NEUROMUSCULAR RE-EDUCATION     THERAPEUTIC ACTIVITIES     THERAPEUTIC EXERCISES        Primary Care Provider Fax #    Physician No Ref-Primary [phone]       No address on file        Equal Access to Services     [facility] : Hadii sam Schneider, waradhada glo, josueybta kaalmada kodak, jennifer wong . So [facility] [phone].    ATENCIÓN: Si habla español, tiene a rangel disposición servicios gratuitos de asistencia lingüística. Llame al [phone].    We comply with applicable federal civil rights laws and [place] laws. We do not discriminate on the basis of race, color, national origin, age, disability, sex, sexual orientation, or gender identity.            Thank you!     Thank you for choosing [facility] [place]  for your care. Our goal is always to provide you with excellent care. Hearing back from our patients is one way we can continue to improve our services. Please take a few minutes to complete the written survey that you may receive in the mail after your visit with us. Thank you!             Your Updated Medication List - Protect others around you: Learn how to safely use, store and throw away your medicines at www.disposemymeds.org.          This list is accurate as of: 1/11/18  1:50 PM.  Always use your most recent med list.                   Brand Name Dispense Instructions for use Diagnosis    cyclobenzaprine 5 MG tablet    FLEXERIL     Take 5 mg by mouth 2 times daily as needed for muscle spasms        HYDROcodone-acetaminophen 5-325 MG per  tablet    NORCO    18 tablet    Take 1 tablet by mouth every 6 hours as needed for moderate to severe pain    Chronic right-sided low back pain with right-sided sciatica       IMITREX PO      Take by mouth every 8 hours as needed for migraine        methylPREDNISolone 4 MG tablet    MEDROL DOSEPAK    21 tablet    Follow package instructions    Acute right-sided low back pain with right-sided sciatica       nicotine 21 MG/24HR 24 hr patch    NICODERM CQ    30 patch    Place 1 patch onto the skin every 24 hours    Encounter for smoking cessation counseling       RESTASIS 0.05 % ophthalmic emulsion   Generic drug:  cycloSPORINE

## 2018-01-12 ENCOUNTER — CARE COORDINATION (OUTPATIENT)
Dept: ANESTHESIOLOGY | Facility: CLINIC | Age: 38
End: 2018-01-12

## 2018-01-12 ENCOUNTER — OFFICE VISIT (OUTPATIENT)
Dept: ORTHOPEDICS | Facility: CLINIC | Age: 38
End: 2018-01-12
Payer: COMMERCIAL

## 2018-01-12 VITALS — HEIGHT: 63 IN | BODY MASS INDEX: 29.27 KG/M2 | WEIGHT: 165.2 LBS

## 2018-01-12 DIAGNOSIS — M54.16 LUMBAR RADICULOPATHY: Primary | ICD-10-CM

## 2018-01-12 DIAGNOSIS — Z13.228 SCREENING FOR ENDOCRINE, NUTRITIONAL, METABOLIC AND IMMUNITY DISORDER: ICD-10-CM

## 2018-01-12 DIAGNOSIS — Z13.29 SCREENING FOR ENDOCRINE, NUTRITIONAL, METABOLIC AND IMMUNITY DISORDER: ICD-10-CM

## 2018-01-12 DIAGNOSIS — Z01.818 PRE-OPERATIVE EXAMINATION: ICD-10-CM

## 2018-01-12 DIAGNOSIS — Z13.21 SCREENING FOR ENDOCRINE, NUTRITIONAL, METABOLIC AND IMMUNITY DISORDER: ICD-10-CM

## 2018-01-12 DIAGNOSIS — Z13.0 SCREENING FOR ENDOCRINE, NUTRITIONAL, METABOLIC AND IMMUNITY DISORDER: ICD-10-CM

## 2018-01-12 PROCEDURE — 87081 CULTURE SCREEN ONLY: CPT | Performed by: ORTHOPAEDIC SURGERY

## 2018-01-12 ASSESSMENT — ENCOUNTER SYMPTOMS
SNORES LOUDLY: 1
EXERCISE INTOLERANCE: 1
HEADACHES: 1
NERVOUS/ANXIOUS: 1
TINGLING: 1
PALPITATIONS: 1
INSOMNIA: 1
BACK PAIN: 1
NECK PAIN: 1
MYALGIAS: 1
MUSCLE WEAKNESS: 1
MUSCLE CRAMPS: 1
NUMBNESS: 1
COUGH: 1
STIFFNESS: 1

## 2018-01-12 NOTE — LETTER
1/12/2018       RE: Gloria Benjamin  [address]  [facility] [address]     Dear Colleague,    Thank you for referring your patient, Gloria Benjamin, to the [facility] at [facility]. Please see a copy of my visit note below.    Spine Surgery Consultation    REFERRING PHYSICIAN: Carli Jesus   PRIMARY CARE PHYSICIAN: No Ref-Primary, Physician           Chief Complaint:   Back Pain (pt states she is here to follow up from her steriod injection, pt states pain went away for a couple of days and then it came back full force.)      History of Present Illness:  Symptom Profile Including: location of symptoms, onset, severity, exacerbating/alleviating factors, previous treatments:        Gloria Benjamin is a 37 year old female who is referred for evaluation of both axial neck pain as well as axial low back pain and right greater than left radicular symptoms. The radicular symptoms are primarily in an S1 distribution. She also has quite severe low back pain. Her symptoms of an going on for 8 months but are worsening over the last 2 months. Her low back pain starts on the right side and is an aching burning pain going down into the bottom of the foot. Sometimes also goes on to the left side. She has done physical therapy with core strengthening and also doing quite a bit of neck range of motion exercises. She has been taking hydrocodone for 10 days. She previously tried Aleve and Flexeril. She is on disability because of her long-standing pain symptoms.     She has endometriosis. She is a one pack per day smoker. She was previously an alcoholic but quit smoking 9 months ago.    At her last visit I sent her for right-sided L5-S1 transforaminal epidural steroid injection.  This was completed on December 22.  She says this helped with about 80% of her pain symptoms.  Somewhat to her surprise, she thought that her biggest problem was the back pain but once the injection was  done she realized that the nerve was actually a substantial component of her symptoms.  This provided near complete relief for 4 days but then all the symptoms returned.  She now thinks she is back to her baseline level of pain symptoms.  She feels quite disabled again.         Past Medical History:     Past Medical History:   Diagnosis Date     Abnormal Pap smear of cervix date unknown    Pt reported, abnormal pap in late teens, results unknown.     Back injury      Neck injuries      Substance abuse             Past Surgical History:     Past Surgical History:   Procedure Laterality Date     APPENDECTOMY       C STOMACH SURGERY PROCEDURE UNLISTED       GYN SURGERY      Ovarian cyst removed     INJECT EPIDURAL LUMBAR / SACRAL SINGLE Right 12/22/2017    Procedure: INJECT EPIDURAL LUMBAR / SACRAL SINGLE;  Right Lumbar Transforaminal Epidural Steroid Injection;  Surgeon: Anibal Kinney MD;  Location:  OR            Social History:     Social History   Substance Use Topics     Smoking status: Current Every Day Smoker     Packs/day: 1.00     Types: Cigarettes     Smokeless tobacco: Never Used     Alcohol use No      Comment: sober since 7 months 3/2017             Family History:     Family History   Problem Relation Age of Onset     Anxiety Disorder Father      Alcoholism Father      Spine Problems Father      Hypertension Mother      Hyperlipidemia Mother      Other Cancer Maternal Grandmother      Alcoholism Maternal Grandfather      Alcoholism Paternal Grandmother      Spine Problems Paternal Grandmother             Allergies:     Allergies   Allergen Reactions     Penicillin G Hives            Medications:     Current Outpatient Prescriptions   Medication     HYDROcodone-acetaminophen (NORCO) 5-325 MG per tablet     cyclobenzaprine (FLEXERIL) 5 MG tablet     methylPREDNISolone (MEDROL DOSEPAK) 4 MG tablet     nicotine (NICODERM CQ) 21 MG/24HR 24 hr patch     RESTASIS 0.05 % ophthalmic emulsion     SUMAtriptan  "Succinate (IMITREX PO)     No current facility-administered medications for this visit.              Review of Systems:     A 10 point ROS was performed and reviewed. Specific responses to these questions are noted at the end of the document.         Physical Exam:   Vitals: Ht 1.6 m (5' 3\")  Wt 74.9 kg (165 lb 3.2 oz)  BMI 29.26 kg/m2  Constitutional: awake, alert, cooperative, no apparent distress, appears stated age.    Eyes: The sclera are white.  Ears, Nose, Throat: The trachea is midline.  Psychiatric: The patient has a normal affect.  Respiratory: breathing non-labored  Cardiovascular: The extremities are warm and perfused.  Skin: no obvious rashes or lesions.  Musculoskeletal, Neurologic, and Spine:   The bilateral lower extremities are examined. 5 out of 5 in hip flexion, knee extension, tibialis anterior. 3/5 extensor right   hallux longus. 4/5 left EHL. Right hamstrings is also 4 out of 5. 4 out of 5 and peroneal muscles on the right. Intact sensation L3 to S1, but diminished to light touch in right S1 distribution. +2 patellar and Achilles reflexes on the left but diminished on the right. Positive right straight leg raise. Negative on the left. No Petr sign. No clonus. I cannot elicit a Babinski sign. She is observed to walk with an antalgic gait.         Imaging:   We ordered and independently reviewed new radiographs at this clinic visit. The results were discussed with the patient.  Findings include:  AP and Lateral Flexion and Extension Lumbar Radiographs Ordered and Reviewed Today: December 8, 2017. No obvious instability. Mild spondylosis worse at L5-S1 with loss of disc space height.     November 24, 2017 lumbar MRI: Annular tear at L5-S1 with broad-based disc bulge resulting in mild to moderate lateral recess stenosis. No foraminal stenosis.             Assessment and Plan:   Assessment:  37 year old female with with L5-S1 spondylosis and a broad-based disc bulge causing right-sided lateral " recess stenosis and S1 radicular symptoms.     Plan:  1. We discussed options moving forward.  At this point she has failed extensive conservative management with physical therapy, oral medications and injections.  Her ANA LAURA is 64.  She is interested in proceeding with surgery.  2. If she did wish to proceed I would recommend an L5-S1 decompression.  3. Risks of this surgery include risk of infection, risk of dural tear resulting in CSF leak which might result in headaches, or possible need for lumbar drain, or possible revision surgery in the setting of a persistent leak. Risk of hematoma or seroma resulting in wound complications.  Possible nerve root injury resulting in numbness weakness or paralysis into the arms or legs. Possible radiculitis which could result in similar symptoms or could result in significant neurogenic type pain. There is also a risk of delayed onset nerve root pals or radiculitis, which I explained is potentially due to stretch injury or possibly due to delayed onset swelling, and is sometimes temporary but can also be permanent.  Risk of incomplete decompression which might require revision surgery in the future.  Risk of adjacent segment problems requiring surgery in the future. Risk of incomplete relief of symptoms possibly requiring revision surgery in the future. Furthermore, although rare, there are risks of major vessel injury such as to the major vessels anteriorly or to the bowel from the surgery.  Sometimes this can happen if an instrument is passed anteriorly through the disc space.  Lastly, although rare, there are certainly risks of the anesthetic including stroke heart attack and death.    4. Furthermore, I did spend some time talking about the risk of incomplete symptom relief.  It is possible there would be permanent or chronic nerve root irritation given that she has had the symptoms for such a long time and that she might not have full relief of her leg pain.  Lastly, she  does have some component of back pain.  I think it is encouraging that the injection helped so much.  However it is possible she would still be left with back pain even after successful decompressive surgery.  5. She would like to proceed.  I will have her see the anesthesia clinic for the H&P and we will get her scheduled at her convenience.      Respectfully,  Fuad Bolaños MD  Spine Surgery  [facility]      Answers for HPI/ROS submitted by the patient on 1/12/2018   General Symptoms: No  Skin Symptoms: No  HENT Symptoms: Yes  EYE SYMPTOMS: No  HEART SYMPTOMS: Yes  LUNG SYMPTOMS: Yes  INTESTINAL SYMPTOMS: No  URINARY SYMPTOMS: No  GYNECOLOGIC SYMPTOMS: No  BREAST SYMPTOMS: No  SKELETAL SYMPTOMS: Yes  BLOOD SYMPTOMS: No  NERVOUS SYSTEM SYMPTOMS: Yes  MENTAL HEALTH SYMPTOMS: Yes  Tinnitus: Yes  Cough: Yes  Snoring: Yes  Fast or irregular heartbeat: Yes  Exercise intolerance: Yes  Back pain: Yes  Muscle aches: Yes  Neck pain: Yes  Muscle cramps: Yes  Muscle weakness: Yes  Joint stiffness: Yes  Headache: Yes  Tingling: Yes  Difficulty walking: Yes  Numbness: Yes  Nervous or Anxious: Yes  Trouble sleeping: Yes      Again, thank you for allowing me to participate in the care of your patient.      Sincerely,    Fuad Bolaños MD

## 2018-01-12 NOTE — PROGRESS NOTES
Purpose of call: Follow up after   1. Right L5-S1 transforaminal epidural steroid injection.  2.  Fluoroscopic guidance for the above-named procedure(s).   Date of service: 12/22/17  Spoke with: Gloria     Location of pain: Low back   Percentage of pain relief after procedure: 50% for 3 days.      Follow up: Pt has already f/u with Dr. Bolaños

## 2018-01-12 NOTE — LETTER
Date:January 15, 2018      Patient was self referred, no letter generated. Do not send.        [facility] Health Information

## 2018-01-12 NOTE — NURSING NOTE
"Reason For Visit:   Chief Complaint   Patient presents with     Back Pain     pt states she is here to follow up from her steriod injection, pt states pain went away for a couple of days and then it came back full force.       Primary MD: No Ref-Primary, Physician  Ref. MD:     ?  No  Occupation None.  Currently working? No.  Work status?  unemployed.  Smoker: Yes  Request smoking cessation information: No    Ht 1.6 m (5' 3\")  Wt 74.9 kg (165 lb 3.2 oz)  BMI 29.26 kg/m2    Pain Assessment  Patient Currently in Pain: Yes  0-10 Pain Scale: 8  Primary Pain Location: Back  Pain Orientation: Lower  Alleviating Factors:  (no alleviating factors)  Aggravating Factors: Standing, Sitting, Lying    Oswestry (ANA LAURA) Questionnaire    OSWESTRY DISABILITY INDEX 1/12/2018   Section 1 - Pain intensity 3   Section 2 - Personal care (washing, dressing, etc.)  2   Section 3 - Lifting 5   Section 4 - Walking 2   Section 5 - Sitting 3   Section 6 - Standing 4   Section 7 - Sleeping 3   Section 8 - Sex life (if applicable) 3   Section 9 - Social life 3   Section 10 - Traveling 4   Count 10   Sum 32   Oswestry Score (%) 64   SECTION 1-PAIN INTENSITY The pain is fairly severe at the moment.   SECTION 2-PERSONAL CARE (WASHING,DRESSING,ETC.) It is painful to look after myself and I am slow and careful.   SECTION 3-LIFTING I cannot lift or carry anything at all.   SECTION 4-WALKING Pain prevents me from walking more than a quarter of a mile.   SECTION 5-SITTING Pain prevents me from sitting for more than half an hour.   SECTION 6-STANDING Pain prevents me from standing for more than 10 minutes.   SECTION 7-SLEEPING Because of pain I have less than 4 hours sleep.   SECTION 8-SEX LIFE (IF APPLICABLE) My sex life is severely restricted by pain.   SECTION 9-SOCIAL LIFE Pain has restricted my social life and I do not go out as often.   SECTION 10-TRAVELING Pain restricts me to short necessary trips of under 30 minutes. "   Oswestry Disability Index: Count 10   ANA LAURA: Total Score = SUM (total for answered questions) 32   Computed Oswestry Score 64 (%)   Some recent data might be hidden                Visual Analog Pain Scale  Back Pain Scale 0-10: 7  Right leg pain: 8  Left leg pain: 3.5    Promis 10 Assessment    PROMIS 10 1/12/2018   In general, would you say your health is: Good   In general, would you say your quality of life is: Good   In general, how would you rate your physical health? Fair   In general, how would you rate your mental health, including your mood and your ability to think? Very good   In general, how would you rate your satisfaction with your social activities and relationships? Fair   In general, please rate how well you carry out your usual social activities and roles Poor   To what extent are you able to carry out your everyday physical activities such as walking, climbing stairs, carrying groceries, or moving a chair? A little   How often have you been bothered by emotional problems such as feeling anxious, depressed or irritable? Sometimes   How would you rate your fatigue on average? Severe   How would you rate your pain on average?   0 = No Pain  to  10 = Worst Imaginable Pain 7   In general, would you say your health is: 3   In general, would you say your quality of life is: 3   In general, how would you rate your physical health? 2   In general, how would you rate your mental health, including your mood and your ability to think? 4   In general, how would you rate your satisfaction with your social activities and relationships? 2   In general, please rate how well you carry out your usual social activities and roles. (This includes activities at home, at work and in your community, and responsibilities as a parent, child, spouse, employee, friend, etc.) 1   To what extent are you able to carry out your everyday physical activities such as walking, climbing stairs, carrying groceries, or moving a chair? 2    In the past 7 days, how often have you been bothered by emotional problems such as feeling anxious, depressed, or irritable? 3   In the past 7 days, how would you rate your fatigue on average? 4   In the past 7 days, how would you rate your pain on average, where 0 means no pain, and 10 means worst imaginable pain? 7   Global Mental Health Score 12   Global Physical Health Score 8   PROMIS TOTAL - SUBSCORES 20   Some recent data might be hidden                Karla Wall CMA

## 2018-01-12 NOTE — PROGRESS NOTES
Spine Surgery Consultation    REFERRING PHYSICIAN: Carli Jesus   PRIMARY CARE PHYSICIAN: No Ref-Primary, Physician           Chief Complaint:   Back Pain (pt states she is here to follow up from her steriod injection, pt states pain went away for a couple of days and then it came back full force.)      History of Present Illness:  Symptom Profile Including: location of symptoms, onset, severity, exacerbating/alleviating factors, previous treatments:        Gloria Benjamin is a 37 year old female who is referred for evaluation of both axial neck pain as well as axial low back pain and right greater than left radicular symptoms. The radicular symptoms are primarily in an S1 distribution. She also has quite severe low back pain. Her symptoms of an going on for 8 months but are worsening over the last 2 months. Her low back pain starts on the right side and is an aching burning pain going down into the bottom of the foot. Sometimes also goes on to the left side. She has done physical therapy with core strengthening and also doing quite a bit of neck range of motion exercises. She has been taking hydrocodone for 10 days. She previously tried Aleve and Flexeril. She is on disability because of her long-standing pain symptoms.     She has endometriosis. She is a one pack per day smoker. She was previously an alcoholic but quit smoking 9 months ago.    At her last visit I sent her for right-sided L5-S1 transforaminal epidural steroid injection.  This was completed on December 22.  She says this helped with about 80% of her pain symptoms.  Somewhat to her surprise, she thought that her biggest problem was the back pain but once the injection was done she realized that the nerve was actually a substantial component of her symptoms.  This provided near complete relief for 4 days but then all the symptoms returned.  She now thinks she is back to her baseline level of pain symptoms.  She feels quite disabled again.         Past  Medical History:     Past Medical History:   Diagnosis Date     Abnormal Pap smear of cervix date unknown    Pt reported, abnormal pap in late teens, results unknown.     Back injury      Neck injuries      Substance abuse             Past Surgical History:     Past Surgical History:   Procedure Laterality Date     APPENDECTOMY       C STOMACH SURGERY PROCEDURE UNLISTED       GYN SURGERY      Ovarian cyst removed     INJECT EPIDURAL LUMBAR / SACRAL SINGLE Right 12/22/2017    Procedure: INJECT EPIDURAL LUMBAR / SACRAL SINGLE;  Right Lumbar Transforaminal Epidural Steroid Injection;  Surgeon: Anibal Kinney MD;  Location:  OR            Social History:     Social History   Substance Use Topics     Smoking status: Current Every Day Smoker     Packs/day: 1.00     Types: Cigarettes     Smokeless tobacco: Never Used     Alcohol use No      Comment: sober since 7 months 3/2017             Family History:     Family History   Problem Relation Age of Onset     Anxiety Disorder Father      Alcoholism Father      Spine Problems Father      Hypertension Mother      Hyperlipidemia Mother      Other Cancer Maternal Grandmother      Alcoholism Maternal Grandfather      Alcoholism Paternal Grandmother      Spine Problems Paternal Grandmother             Allergies:     Allergies   Allergen Reactions     Penicillin G Hives            Medications:     Current Outpatient Prescriptions   Medication     HYDROcodone-acetaminophen (NORCO) 5-325 MG per tablet     cyclobenzaprine (FLEXERIL) 5 MG tablet     methylPREDNISolone (MEDROL DOSEPAK) 4 MG tablet     nicotine (NICODERM CQ) 21 MG/24HR 24 hr patch     RESTASIS 0.05 % ophthalmic emulsion     SUMAtriptan Succinate (IMITREX PO)     No current facility-administered medications for this visit.              Review of Systems:     A 10 point ROS was performed and reviewed. Specific responses to these questions are noted at the end of the document.         Physical Exam:   Vitals: Ht 1.6 m  "(5' 3\")  Wt 74.9 kg (165 lb 3.2 oz)  BMI 29.26 kg/m2  Constitutional: awake, alert, cooperative, no apparent distress, appears stated age.    Eyes: The sclera are white.  Ears, Nose, Throat: The trachea is midline.  Psychiatric: The patient has a normal affect.  Respiratory: breathing non-labored  Cardiovascular: The extremities are warm and perfused.  Skin: no obvious rashes or lesions.  Musculoskeletal, Neurologic, and Spine:   The bilateral lower extremities are examined. 5 out of 5 in hip flexion, knee extension, tibialis anterior. 3/5 extensor right   hallux longus. 4/5 left EHL. Right hamstrings is also 4 out of 5. 4 out of 5 and peroneal muscles on the right. Intact sensation L3 to S1, but diminished to light touch in right S1 distribution. +2 patellar and Achilles reflexes on the left but diminished on the right. Positive right straight leg raise. Negative on the left. No Petr sign. No clonus. I cannot elicit a Babinski sign. She is observed to walk with an antalgic gait.         Imaging:   We ordered and independently reviewed new radiographs at this clinic visit. The results were discussed with the patient.  Findings include:  AP and Lateral Flexion and Extension Lumbar Radiographs Ordered and Reviewed Today: December 8, 2017. No obvious instability. Mild spondylosis worse at L5-S1 with loss of disc space height.     November 24, 2017 lumbar MRI: Annular tear at L5-S1 with broad-based disc bulge resulting in mild to moderate lateral recess stenosis. No foraminal stenosis.             Assessment and Plan:   Assessment:  37 year old female with with L5-S1 spondylosis and a broad-based disc bulge causing right-sided lateral recess stenosis and S1 radicular symptoms.     Plan:  1. We discussed options moving forward.  At this point she has failed extensive conservative management with physical therapy, oral medications and injections.  Her ANA LAURA is 64.  She is interested in proceeding with surgery.  2. If " she did wish to proceed I would recommend an L5-S1 decompression.  3. Risks of this surgery include risk of infection, risk of dural tear resulting in CSF leak which might result in headaches, or possible need for lumbar drain, or possible revision surgery in the setting of a persistent leak. Risk of hematoma or seroma resulting in wound complications.  Possible nerve root injury resulting in numbness weakness or paralysis into the arms or legs. Possible radiculitis which could result in similar symptoms or could result in significant neurogenic type pain. There is also a risk of delayed onset nerve root pals or radiculitis, which I explained is potentially due to stretch injury or possibly due to delayed onset swelling, and is sometimes temporary but can also be permanent.  Risk of incomplete decompression which might require revision surgery in the future.  Risk of adjacent segment problems requiring surgery in the future. Risk of incomplete relief of symptoms possibly requiring revision surgery in the future. Furthermore, although rare, there are risks of major vessel injury such as to the major vessels anteriorly or to the bowel from the surgery.  Sometimes this can happen if an instrument is passed anteriorly through the disc space.  Lastly, although rare, there are certainly risks of the anesthetic including stroke heart attack and death.    4. Furthermore, I did spend some time talking about the risk of incomplete symptom relief.  It is possible there would be permanent or chronic nerve root irritation given that she has had the symptoms for such a long time and that she might not have full relief of her leg pain.  Lastly, she does have some component of back pain.  I think it is encouraging that the injection helped so much.  However it is possible she would still be left with back pain even after successful decompressive surgery.  5. She would like to proceed.  I will have her see the anesthesia clinic for  the H&P and we will get her scheduled at her convenience.      Respectfully,  Fuad Bolaños MD  Spine Surgery  [facility]      Answers for HPI/ROS submitted by the patient on 1/12/2018   General Symptoms: No  Skin Symptoms: No  HENT Symptoms: Yes  EYE SYMPTOMS: No  HEART SYMPTOMS: Yes  LUNG SYMPTOMS: Yes  INTESTINAL SYMPTOMS: No  URINARY SYMPTOMS: No  GYNECOLOGIC SYMPTOMS: No  BREAST SYMPTOMS: No  SKELETAL SYMPTOMS: Yes  BLOOD SYMPTOMS: No  NERVOUS SYSTEM SYMPTOMS: Yes  MENTAL HEALTH SYMPTOMS: Yes  Tinnitus: Yes  Cough: Yes  Snoring: Yes  Fast or irregular heartbeat: Yes  Exercise intolerance: Yes  Back pain: Yes  Muscle aches: Yes  Neck pain: Yes  Muscle cramps: Yes  Muscle weakness: Yes  Joint stiffness: Yes  Headache: Yes  Tingling: Yes  Difficulty walking: Yes  Numbness: Yes  Nervous or Anxious: Yes  Trouble sleeping: Yes

## 2018-01-12 NOTE — LETTER
1/12/2018      RE: Gloria Benjamin  [address]  [place] [address]       Spine Surgery Consultation    REFERRING PHYSICIAN: Carli Jesus   PRIMARY CARE PHYSICIAN: No Ref-Primary, Physician           Chief Complaint:   Back Pain (pt states she is here to follow up from her steriod injection, pt states pain went away for a couple of days and then it came back full force.)      History of Present Illness:  Symptom Profile Including: location of symptoms, onset, severity, exacerbating/alleviating factors, previous treatments:        Gloria Benjamin is a 37 year old female who is referred for evaluation of both axial neck pain as well as axial low back pain and right greater than left radicular symptoms. The radicular symptoms are primarily in an S1 distribution. She also has quite severe low back pain. Her symptoms of an going on for 8 months but are worsening over the last 2 months. Her low back pain starts on the right side and is an aching burning pain going down into the bottom of the foot. Sometimes also goes on to the left side. She has done physical therapy with core strengthening and also doing quite a bit of neck range of motion exercises. She has been taking hydrocodone for 10 days. She previously tried Aleve and Flexeril. She is on disability because of her long-standing pain symptoms.     She has endometriosis. She is a one pack per day smoker. She was previously an alcoholic but quit smoking 9 months ago.    At her last visit I sent her for right-sided L5-S1 transforaminal epidural steroid injection.  This was completed on December 22.  She says this helped with about 80% of her pain symptoms.  Somewhat to her surprise, she thought that her biggest problem was the back pain but once the injection was done she realized that the nerve was actually a substantial component of her symptoms.  This provided near complete relief for 4 days but then all the symptoms returned.  She now thinks she is back to her  baseline level of pain symptoms.  She feels quite disabled again.         Past Medical History:     Past Medical History:   Diagnosis Date     Abnormal Pap smear of cervix date unknown    Pt reported, abnormal pap in late teens, results unknown.     Back injury      Neck injuries      Substance abuse             Past Surgical History:     Past Surgical History:   Procedure Laterality Date     APPENDECTOMY       C STOMACH SURGERY PROCEDURE UNLISTED       GYN SURGERY      Ovarian cyst removed     INJECT EPIDURAL LUMBAR / SACRAL SINGLE Right 12/22/2017    Procedure: INJECT EPIDURAL LUMBAR / SACRAL SINGLE;  Right Lumbar Transforaminal Epidural Steroid Injection;  Surgeon: Anibal Kinney MD;  Location:  OR            Social History:     Social History   Substance Use Topics     Smoking status: Current Every Day Smoker     Packs/day: 1.00     Types: Cigarettes     Smokeless tobacco: Never Used     Alcohol use No      Comment: sober since 7 months 3/2017             Family History:     Family History   Problem Relation Age of Onset     Anxiety Disorder Father      Alcoholism Father      Spine Problems Father      Hypertension Mother      Hyperlipidemia Mother      Other Cancer Maternal Grandmother      Alcoholism Maternal Grandfather      Alcoholism Paternal Grandmother      Spine Problems Paternal Grandmother             Allergies:     Allergies   Allergen Reactions     Penicillin G Hives            Medications:     Current Outpatient Prescriptions   Medication     HYDROcodone-acetaminophen (NORCO) 5-325 MG per tablet     cyclobenzaprine (FLEXERIL) 5 MG tablet     methylPREDNISolone (MEDROL DOSEPAK) 4 MG tablet     nicotine (NICODERM CQ) 21 MG/24HR 24 hr patch     RESTASIS 0.05 % ophthalmic emulsion     SUMAtriptan Succinate (IMITREX PO)     No current facility-administered medications for this visit.              Review of Systems:     A 10 point ROS was performed and reviewed. Specific responses to these questions  "are noted at the end of the document.         Physical Exam:   Vitals: Ht 1.6 m (5' 3\")  Wt 74.9 kg (165 lb 3.2 oz)  BMI 29.26 kg/m2  Constitutional: awake, alert, cooperative, no apparent distress, appears stated age.    Eyes: The sclera are white.  Ears, Nose, Throat: The trachea is midline.  Psychiatric: The patient has a normal affect.  Respiratory: breathing non-labored  Cardiovascular: The extremities are warm and perfused.  Skin: no obvious rashes or lesions.  Musculoskeletal, Neurologic, and Spine:   The bilateral lower extremities are examined. 5 out of 5 in hip flexion, knee extension, tibialis anterior. 3/5 extensor right   hallux longus. 4/5 left EHL. Right hamstrings is also 4 out of 5. 4 out of 5 and peroneal muscles on the right. Intact sensation L3 to S1, but diminished to light touch in right S1 distribution. +2 patellar and Achilles reflexes on the left but diminished on the right. Positive right straight leg raise. Negative on the left. No Petr sign. No clonus. I cannot elicit a Babinski sign. She is observed to walk with an antalgic gait.         Imaging:   We ordered and independently reviewed new radiographs at this clinic visit. The results were discussed with the patient.  Findings include:  AP and Lateral Flexion and Extension Lumbar Radiographs Ordered and Reviewed Today: December 8, 2017. No obvious instability. Mild spondylosis worse at L5-S1 with loss of disc space height.     November 24, 2017 lumbar MRI: Annular tear at L5-S1 with broad-based disc bulge resulting in mild to moderate lateral recess stenosis. No foraminal stenosis.             Assessment and Plan:   Assessment:  37 year old female with with L5-S1 spondylosis and a broad-based disc bulge causing right-sided lateral recess stenosis and S1 radicular symptoms.     Plan:  1. We discussed options moving forward.  At this point she has failed extensive conservative management with physical therapy, oral medications and " injections.  Her ANA LAURA is 64.  She is interested in proceeding with surgery.  2. If she did wish to proceed I would recommend an L5-S1 decompression.  3. Risks of this surgery include risk of infection, risk of dural tear resulting in CSF leak which might result in headaches, or possible need for lumbar drain, or possible revision surgery in the setting of a persistent leak. Risk of hematoma or seroma resulting in wound complications.  Possible nerve root injury resulting in numbness weakness or paralysis into the arms or legs. Possible radiculitis which could result in similar symptoms or could result in significant neurogenic type pain. There is also a risk of delayed onset nerve root pals or radiculitis, which I explained is potentially due to stretch injury or possibly due to delayed onset swelling, and is sometimes temporary but can also be permanent.  Risk of incomplete decompression which might require revision surgery in the future.  Risk of adjacent segment problems requiring surgery in the future. Risk of incomplete relief of symptoms possibly requiring revision surgery in the future. Furthermore, although rare, there are risks of major vessel injury such as to the major vessels anteriorly or to the bowel from the surgery.  Sometimes this can happen if an instrument is passed anteriorly through the disc space.  Lastly, although rare, there are certainly risks of the anesthetic including stroke heart attack and death.    4. Furthermore, I did spend some time talking about the risk of incomplete symptom relief.  It is possible there would be permanent or chronic nerve root irritation given that she has had the symptoms for such a long time and that she might not have full relief of her leg pain.  Lastly, she does have some component of back pain.  I think it is encouraging that the injection helped so much.  However it is possible she would still be left with back pain even after successful decompressive  surgery.  5. She would like to proceed.  I will have her see the anesthesia clinic for the H&P and we will get her scheduled at her convenience.      Fuad Bolaños MD

## 2018-01-12 NOTE — NURSING NOTE
Teaching Flowsheet   Relevant Diagnosis: lumbar radiculopathy   Teaching Topic: pre-op spine teaching     Person(s) involved in teaching:   Patient     Motivation Level:  Asks Questions: Yes  Eager to Learn: Yes  Cooperative: Yes  Receptive (willing/able to accept information): Yes  Any cultural factors/[employer] beliefs that may influence understanding or compliance? No       Patient demonstrates understanding of the following:  Reason for the appointment, diagnosis and treatment plan: Yes  Knowledge of proper use of medications and conditions for which they are ordered (with special attention to potential side effects or drug interactions): Yes  Which situations necessitate calling provider and whom to contact: Yes     Teaching Concerns Addressed:   Comments: Pt will see PAC for H&P prior to surgery. Labs today include MRSA nasal swab, albumin/HgbA1c/Vit D     Proper use and care of medication (medical equip, care aids, etc.): Yes  Nutritional needs and diet plan: Yes  Pain management techniques: Yes  Wound Care: Yes  How and/when to access community resources: Yes     Instructional Materials Used/Given: pre-op teach packet, antiseptic soap     Time spent with patient: 15 minutes.

## 2018-01-12 NOTE — MR AVS SNAPSHOT
After Visit Summary   1/12/2018    Gloria Benjamin    MRN: [id number]           Patient Information     Date Of Birth          1980        Visit Information        Provider Department      1/12/2018 9:00 AM Fuad Bolaños MD [facility]        Today's Diagnoses     Lumbar radiculopathy    -  1       Follow-ups after your visit        Additional Services     PAC Visit Referral (For [facility] Only)       Does this visit require an Anesthesia consult?  L4-5 Decompression    H&P done by:  N/A and Other (Specify): PAC      Please be aware that coverage of these services is subject to the terms and limitations of your health insurance plan.  Call member services at your health plan with any benefit or coverage questions.      Please bring the following to your appointment:  >>   Any x-rays, CTs or MRIs which have been performed.  Contact the facility where they were done to arrange for  prior to your scheduled appointment.  Any new CT, MRI or other procedures ordered by your specialist must be performed at a [facility] facility or coordinated by your clinic's referral office.    >>   List of current medications  >>   This referral request   >>   Any documents/labs given to you for this referral                  Who to contact     Please call your clinic at [phone] to:    Ask questions about your health    Make or cancel appointments    Discuss your medicines    Learn about your test results    Speak to your doctor   If you have compliments or concerns about an experience at your clinic, or if you wish to file a complaint, please contact [facility] Patient Relations at [phone] or email us at [email or web address]         Additional Information About Your Visit        MyChart Information     Mobiliot gives you secure access to your electronic health record. If you see a primary care provider, you can also send messages to your care  "team and make appointments. If you have questions, please call your primary care clinic.  If you do not have a primary care provider, please call [phone] and they will assist you.      Qubole is an electronic gateway that provides easy, online access to your medical records. With Qubole, you can request a clinic appointment, read your test results, renew a prescription or communicate with your care team.     To access your existing account, please contact your [facility] or call [phone] for assistance.        Care EveryWhere ID     This is your Care EveryWhere ID. This could be used by other organizations to access your [place] medical records  [id number]        Your Vitals Were     Height BMI (Body Mass Index)                1.6 m (5' 3\") 29.26 kg/m2           Blood Pressure from Last 3 Encounters:   12/22/17 136/80   11/09/17 113/80   10/24/17 118/77    Weight from Last 3 Encounters:   01/12/18 74.9 kg (165 lb 3.2 oz)   12/22/17 70.3 kg (155 lb)   12/08/17 74.6 kg (164 lb 6.4 oz)              We Performed the Following     PAC Visit Referral (For [facility] Only)     Anne-Operative Worksheet (Ortho General)        Primary Care Provider Fax #    Physician No Ref-Primary [phone]       No address on file        Equal Access to Services     ANUJA SALVADOR : Hadii aad ku hadasho Soaris, waaxda luqadaha, qaybta kaalmada adeegyada, jennifer wong . So [facility] [phone].    ATENCIÓN: Si habla español, tiene a rangel disposición servicios gratuitos de asistencia lingüística. Llame al [phone].    We comply with applicable federal civil rights laws and [place] laws. We do not discriminate on the basis of race, color, national origin, age, disability, sex, sexual orientation, or gender identity.            Thank you!     Thank you for choosing [facility]  for your care. Our goal is always to provide you with excellent care. Hearing back " from our patients is one way we can continue to improve our services. Please take a few minutes to complete the written survey that you may receive in the mail after your visit with us. Thank you!             Your Updated Medication List - Protect others around you: Learn how to safely use, store and throw away your medicines at www.disposemymeds.org.          This list is accurate as of: 1/12/18  9:55 AM.  Always use your most recent med list.                   Brand Name Dispense Instructions for use Diagnosis    cyclobenzaprine 5 MG tablet    FLEXERIL     Take 5 mg by mouth 2 times daily as needed for muscle spasms        HYDROcodone-acetaminophen 5-325 MG per tablet    NORCO    18 tablet    Take 1 tablet by mouth every 6 hours as needed for moderate to severe pain    Chronic right-sided low back pain with right-sided sciatica       IMITREX PO      Take by mouth every 8 hours as needed for migraine        methylPREDNISolone 4 MG tablet    MEDROL DOSEPAK    21 tablet    Follow package instructions    Acute right-sided low back pain with right-sided sciatica       nicotine 21 MG/24HR 24 hr patch    NICODERM CQ    30 patch    Place 1 patch onto the skin every 24 hours    Encounter for smoking cessation counseling       RESTASIS 0.05 % ophthalmic emulsion   Generic drug:  cycloSPORINE

## 2018-01-14 LAB
BACTERIA SPEC CULT: NORMAL
Lab: NORMAL
SPECIMEN SOURCE: NORMAL

## 2018-01-17 ENCOUNTER — THERAPY VISIT (OUTPATIENT)
Dept: PHYSICAL THERAPY | Facility: CLINIC | Age: 38
End: 2018-01-17
Payer: COMMERCIAL

## 2018-01-17 DIAGNOSIS — M54.2 CERVICALGIA: ICD-10-CM

## 2018-01-17 DIAGNOSIS — M54.41 ACUTE RIGHT-SIDED LOW BACK PAIN WITH RIGHT-SIDED SCIATICA: ICD-10-CM

## 2018-01-17 PROCEDURE — 97530 THERAPEUTIC ACTIVITIES: CPT | Mod: GP | Performed by: PHYSICAL THERAPIST

## 2018-01-17 PROCEDURE — 97110 THERAPEUTIC EXERCISES: CPT | Mod: GP | Performed by: PHYSICAL THERAPIST

## 2018-01-17 PROCEDURE — 97140 MANUAL THERAPY 1/> REGIONS: CPT | Mod: GP | Performed by: PHYSICAL THERAPIST

## 2018-01-17 NOTE — MR AVS SNAPSHOT
After Visit Summary   1/17/2018    Gloria Benjamin    MRN: [id number]           Patient Information     Date Of Birth          1980        Visit Information        Provider Department      1/17/2018 3:20 PM Yuly Cage, PT [facility]        Today's Diagnoses     Cervicalgia        Acute right-sided low back pain with right-sided sciatica           Follow-ups after your visit        Your next 10 appointments already scheduled     Jan 18, 2018  1:00 PM CST   (Arrive by 12:45 PM)   PAC EVALUATION with  Pac Junito 8   [facility] ([facility] [place])    [address] [place] [address]  4th [facility] [address]   [phone]            Jan 18, 2018  2:00 PM CST   (Arrive by 1:45 PM)   PAC RN ASSESSMENT with  Pac Rn   [facility] Preoperative Assessment [place] ([facility] [place])    [address] [place] [address]   [phone]            Jan 18, 2018  2:40 PM CST   (Arrive by 2:25 PM)   PAC Anesthesia Consult with  Pac Anesthesiologist   [facility] Preoperative Assessment [place] ([facility] [place])    [address] [place] [address]  4th [facility] [address]   [phone]            Jan 22, 2018   Procedure with Fuad Bolaños MD   [facility] ([facility])    [address] [place] [address]  5th [facility] [address]   [phone]           Located in the Clinics and Surgery Center at [address] [place] [address].   parking is very convenient and highly recommended.  is a $6 flat rate fee.  Both  and self parkers should enter the main arrival plaza from [facility]; parking attendants will direct you based on your parking preference.            Mar 06, 2018 10:40 AM CST   (Arrive by 10:10 AM)   RETURN SPINE with Fuad Bolaños MD   [facility] Orthopa[facility]  ([facility])    [address]  4th Floor  [facility] [id number] [phone]            Apr 17, 2018  9:30 AM CDT   (Arrive by 9:00 AM)   RETURN SPINE with Fuad Bolaños MD   [facility] ([facility] [place])    [address]  4th [facility] [id number] [phone]              Who to contact     If you have questions or need follow up information about today's clinic visit or your schedule please contact [facility] directly at [phone].  Normal or non-critical lab and imaging results will be communicated to you by Thingieshart, letter or phone within 4 business days after the clinic has received the results. If you do not hear from us within 7 days, please contact the clinic through Content360t or phone. If you have a critical or abnormal lab result, we will notify you by phone as soon as possible.  Submit refill requests through [employer] / [email or web address] or call your pharmacy and they will forward the refill request to us. Please allow 3 business days for your refill to be completed.          Additional Information About Your Visit        ThingiesharSLI Systems Information     [employer] [email or web address] gives you secure access to your electronic health record. If you see a primary care provider, you can also send messages to your care team and make appointments. If you have questions, please call your primary care clinic.  If you do not have a primary care provider, please call [phone] and they will assist you.        Care EveryWhere ID     This is your Care EveryWhere ID. This could be used by other organizations to access your [place] medical records  [id number]         Blood Pressure from Last 3 Encounters:   12/22/17 136/80   11/09/17 113/80   10/24/17 118/77    Weight from Last 3 Encounters:   01/12/18 74.9 kg (165 lb 3.2 oz)   12/22/17 70.3 kg (155 lb)   12/08/17 74.6 kg (164 lb 6.4 oz)              We Performed the  Following     MANUAL THER TECH,1+REGIONS,EA 15 MIN     THERAPEUTIC ACTIVITIES     THERAPEUTIC EXERCISES        Primary Care Provider Fax #    Physician No Ref-Primary [phone]       No address on file        Equal Access to Services     ANUJA SALVADOR : Hadii aad ku hadyusef Castellonaris, dominguezda silqadaha, zachta kaalmada kodak, jennifer kenny laDadaricardo workman. So [facility] [phone].    ATENCIÓN: Si habla español, tiene a rangel disposición servicios gratuitos de asistencia lingüística. Llame al [phone].    We comply with applicable federal civil rights laws and [place] laws. We do not discriminate on the basis of race, color, national origin, age, disability, sex, sexual orientation, or gender identity.            Thank you!     Thank you for choosing [facility] [place]  for your care. Our goal is always to provide you with excellent care. Hearing back from our patients is one way we can continue to improve our services. Please take a few minutes to complete the written survey that you may receive in the mail after your visit with us. Thank you!             Your Updated Medication List - Protect others around you: Learn how to safely use, store and throw away your medicines at www.disposemymeds.org.          This list is accurate as of: 1/17/18  5:01 PM.  Always use your most recent med list.                   Brand Name Dispense Instructions for use Diagnosis    cyclobenzaprine 5 MG tablet    FLEXERIL     Take 5 mg by mouth 2 times daily as needed for muscle spasms        HYDROcodone-acetaminophen 5-325 MG per tablet    NORCO    18 tablet    Take 1 tablet by mouth every 6 hours as needed for moderate to severe pain    Chronic right-sided low back pain with right-sided sciatica       IMITREX PO      Take by mouth every 8 hours as needed for migraine        methylPREDNISolone 4 MG tablet    MEDROL DOSEPAK    21 tablet    Follow package instructions    Acute right-sided low back  pain with right-sided sciatica       nicotine 21 MG/24HR 24 hr patch    NICODERM CQ    30 patch    Place 1 patch onto the skin every 24 hours    Encounter for smoking cessation counseling       RESTASIS 0.05 % ophthalmic emulsion   Generic drug:  cycloSPORINE

## 2018-01-18 ENCOUNTER — APPOINTMENT (OUTPATIENT)
Dept: SURGERY | Facility: CLINIC | Age: 38
End: 2018-01-18
Payer: COMMERCIAL

## 2018-01-18 ENCOUNTER — ANESTHESIA EVENT (OUTPATIENT)
Dept: SURGERY | Facility: AMBULATORY SURGERY CENTER | Age: 38
End: 2018-01-18

## 2018-01-18 ENCOUNTER — ALLIED HEALTH/NURSE VISIT (OUTPATIENT)
Dept: SURGERY | Facility: CLINIC | Age: 38
End: 2018-01-18
Payer: COMMERCIAL

## 2018-01-18 ENCOUNTER — OFFICE VISIT (OUTPATIENT)
Dept: SURGERY | Facility: CLINIC | Age: 38
End: 2018-01-18
Payer: COMMERCIAL

## 2018-01-18 VITALS
BODY MASS INDEX: 28.19 KG/M2 | RESPIRATION RATE: 16 BRPM | WEIGHT: 159.1 LBS | OXYGEN SATURATION: 98 % | HEIGHT: 63 IN | DIASTOLIC BLOOD PRESSURE: 81 MMHG | TEMPERATURE: 98.1 F | HEART RATE: 75 BPM | SYSTOLIC BLOOD PRESSURE: 134 MMHG

## 2018-01-18 DIAGNOSIS — Z01.818 PREOP EXAMINATION: ICD-10-CM

## 2018-01-18 DIAGNOSIS — Z01.818 PREOP EXAMINATION: Primary | ICD-10-CM

## 2018-01-18 DIAGNOSIS — M54.41 CHRONIC BILATERAL LOW BACK PAIN WITH RIGHT-SIDED SCIATICA: ICD-10-CM

## 2018-01-18 DIAGNOSIS — Z13.0 SCREENING FOR ENDOCRINE, NUTRITIONAL, METABOLIC AND IMMUNITY DISORDER: ICD-10-CM

## 2018-01-18 DIAGNOSIS — Z13.29 SCREENING FOR ENDOCRINE, NUTRITIONAL, METABOLIC AND IMMUNITY DISORDER: ICD-10-CM

## 2018-01-18 DIAGNOSIS — Z13.21 SCREENING FOR ENDOCRINE, NUTRITIONAL, METABOLIC AND IMMUNITY DISORDER: ICD-10-CM

## 2018-01-18 DIAGNOSIS — G89.29 CHRONIC BILATERAL LOW BACK PAIN WITH RIGHT-SIDED SCIATICA: ICD-10-CM

## 2018-01-18 DIAGNOSIS — Z13.228 SCREENING FOR ENDOCRINE, NUTRITIONAL, METABOLIC AND IMMUNITY DISORDER: ICD-10-CM

## 2018-01-18 DIAGNOSIS — Z01.818 PRE-OPERATIVE EXAMINATION: ICD-10-CM

## 2018-01-18 LAB
ALBUMIN SERPL-MCNC: 4 G/DL (ref 3.4–5)
CREAT SERPL-MCNC: 0.63 MG/DL (ref 0.52–1.04)
DEPRECATED CALCIDIOL+CALCIFEROL SERPL-MC: 22 UG/L (ref 20–75)
GFR SERPL CREATININE-BSD FRML MDRD: >90 ML/MIN/1.7M2
HBA1C MFR BLD: 5.2 % (ref 4.3–6)
HGB BLD-MCNC: 14.2 G/DL (ref 11.7–15.7)
POTASSIUM SERPL-SCNC: 3.8 MMOL/L (ref 3.4–5.3)

## 2018-01-18 PROCEDURE — 82306 VITAMIN D 25 HYDROXY: CPT | Performed by: ORTHOPAEDIC SURGERY

## 2018-01-18 ASSESSMENT — LIFESTYLE VARIABLES: TOBACCO_USE: 1

## 2018-01-18 NOTE — ANESTHESIA PREPROCEDURE EVALUATION
Anesthesia Evaluation     . Pt has had prior anesthetic. Type: General    History of anesthetic complications    hypotension      ROS/MED HX    ENT/Pulmonary:     (+)SEBASTIEN risk factors daytime somnolence, tobacco use, Current use 1 packs/day  , . .    Neurologic:     (+)migraines, other neuro right lumbar radiculopathy    Cardiovascular:     (+) ----. : . . . :. . No previous cardiac testing       METS/Exercise Tolerance:  3 - Able to walk 1-2 blocks without stopping   Hematologic:  - neg hematologic  ROS       Musculoskeletal:   (+) , , other musculoskeletal- chronic neck and low back pain, right lumbar radiculopathy      GI/Hepatic:     (+) Other GI/Hepatic rare heartburn      Renal/Genitourinary:     (+) Other Renal/ Genitourinary, endometriosis with heavy menses      Endo:  - neg endo ROS       Psychiatric:     (+) psychiatric history other (comment) (alcohol dependence in remission since 3/2017)      Infectious Disease:  - neg infectious disease ROS       Malignancy:      - no malignancy   Other:    (+) No chance of pregnancy H/O Chronic Pain,                   Physical Exam  Normal systems: cardiovascular, pulmonary and dental    Airway   Mallampati: II  TM distance: >3 FB  Neck ROM: full    Dental     Cardiovascular   Rhythm and rate: regular and normal      Pulmonary    breath sounds clear to auscultation               PAC Discussion and Assessment    ASA Classification: 2  Case is suitable for: ASC  Anesthetic techniques and relevant risks discussed: GA  Invasive monitoring and risk discussed:   Types:   Possibility and Risk of blood transfusion discussed:   NPO instructions given:   Additional anesthetic preparation and risks discussed:   Needs early admission to pre-op area:   Other:     PAC Resident/NP Anesthesia Assessment:  Gloria Benjamin is a 37 year old female scheduled for a Lumbar 5 to Sacral 1 Decompression on 1/22/2018 by Dr. Bolaños in treatment of bilateral low back pain with lumbar radiculopathy.   PAC referral for risk assessment and optimization for anesthesia with comorbid conditions of: alcohol dependence in remission, chronic neck pain, endometriosis, tobacco use disorder, migraines, and heartburn.    Pre-operative considerations:  1.  Cardiac:  Functional status- METS >4.  She reports that she hasn't been able to do much physical activity over the last few months due to pain, but that 3-4 months ago she walking 3 miles a few times per week for exercise with no complications.  She denies any exertional dyspnea with any of her current daily activities and feels she can still walk at least a block with no problems.  She has no known cardiac history.  Intermediate risk surgery with 0.4% risk of major adverse cardiac event.    2.  Pulm:  Airway feasible.  SEBASTIEN risk: low.  She smokes 1ppd, but reports that she will be quitting smoking upon her surgery date.  She has nicotine gum at home in preparation.    3.  GI:  Risk of PONV score = 2.  If > 2, anti-emetic intervention recommended. She endorses rare heartburn which she uses over the counter tums to treat.    4. Psych:  Alcohol dependence in remission since March 2017 after completing a treatment program.    5. Neuro:  History of migraines - instructed to refrain from using her Imitrex within the 24 hours prior to surgery.    6. Anesthesia:  The patient reports that she had post-op hypotension with both prior surgeries.      VTE risk:  0.26%    Patient is optimized and is acceptable candidate for the proposed procedure.  No further diagnostic evaluation is needed.       For further details of assessment, testing, and physical exam please see H and P completed on same date.          Rand Valdes DNP, RN, APRN      Reviewed and Signed by PAC Mid-Level Provider/Resident  Mid-Level Provider/Resident: Rand Valdes DNP, RN, APRN  Date: 1/18/2018  Time: 1533    Attending Anesthesiologist Anesthesia Assessment:        Anesthesiologist:   Date:   Time:    Pass/Fail:   Disposition:     PAC Pharmacist Assessment:        Pharmacist:   Date:   Time:      Anesthesia Plan      History & Physical Review  History and physical reviewed and following examination; no interval change.    ASA Status:  2 .    NPO Status:  > 8 hours (>2 hours NPO from clear liquids)    Plan for General and ETT with Intravenous induction. Maintenance will be Balanced.    PONV prophylaxis:  Ondansetron (or other 5HT-3) and Dexamethasone or Solumedrol  - ASA 2  - GETA with standard ASA monitors, IV induction, balanced anesthetic  - PIV  - Antibiotics per surgery  - PONV prophylaxis  - Pain management with Fentanyl/dilaudid boluses as needed  - Relevant risks, benefits, alternatives and the anesthetic plan were discussed with patient/family or family representative. All questions were answered and there was agreement to proceed.    Steph Lopez MD PGY4    I have reviewed Dr. Lopez's pre-operative evaluation, performed my own assessment, and I agree with the plan for anesthesia.  Plan for GETA with PIV x 1, routine analgesia and antiemetics.    Evaristo Angulo MD  Anesthesiologist  10:07 AM  January 22, 2018          Postoperative Care  Postoperative pain management:  Multi-modal analgesia.      Consents  Anesthetic plan, risks, benefits and alternatives discussed with:  Patient.  Use of blood products discussed: No .   .                          .

## 2018-01-18 NOTE — H&P
Pre-Operative H & P     CC:  Preoperative exam to assess for increased cardiopulmonary risk while undergoing surgery and anesthesia.    Date of Encounter: 1/18/2018  Primary Care Physician:  No Ref-Primary, Physician    HPI  Gloria Benjamin is a 37 year old female who presents for pre-operative H & P in preparation for a Lumbar 5 to Sacral 1 Decompression with Dr. Bolaños on 1/22/2018 at [facility].     Gloria Benjamin is a 37 year old female with alcohol dependence in remission, chronic neck pain, endometriosis, tobacco use disorder, migraines, and heartburn that has chronic back pain with lumbar radiculopathy.  She denies any specific known back injury, but reports that the pain has been present for about a year and worsened significantly over the last few months.  Conservative treatment approaches have included oral steroids, steroid injections, physical therapy and oral medications.  None of the conservative measures have led to longstanding pain relief.  She has consulted with Dr. Bolaños for surgical options and the above listed surgery has been recommended.      History is obtained from the patient and her mother.     Past Medical History  Past Medical History:   Diagnosis Date     Abnormal Pap smear of cervix date unknown    Pt reported, abnormal pap in late teens, results unknown.     Alcohol dependence in remission (H)     in remission since 3/2017     Back injury      Chronic low back pain      Chronic neck pain      Endometriosis      Lumbar radiculopathy      Migraines      Neck injuries      Tobacco use        Past Surgical History  Past Surgical History:   Procedure Laterality Date     APPENDECTOMY       GYN SURGERY      Ovarian cyst removed     INJECT EPIDURAL LUMBAR / SACRAL SINGLE Right 12/22/2017    Procedure: INJECT EPIDURAL LUMBAR / SACRAL SINGLE;  Right Lumbar Transforaminal Epidural Steroid Injection;  Surgeon: Anibal Kinney MD;  Location: [facility] OR       Hx of Blood  transfusions/reactions: none    Hx of abnormal bleeding or anti-platelet use: none    Menstrual history: Patient's last menstrual period was 01/18/2018.:     Steroid use in the last year: medrol dosepak 2-3 months ago.  No long term steroids.      Personal or FH with difficulty with Anesthesia:  Post op hypotension with both prior surgeries.  No known family history of anesthesia complications.      Prior to Admission Medications  Current Outpatient Prescriptions   Medication Sig Dispense Refill     Acetaminophen (TYLENOL EXTRA STRENGTH PO) Take 2 tablets by mouth 4 times daily as needed       cyclobenzaprine (FLEXERIL) 5 MG tablet Take 5 mg by mouth 2 times daily as needed for muscle spasms       nicotine (NICODERM CQ) 21 MG/24HR 24 hr patch Place 1 patch onto the skin every 24 hours 30 patch 0     RESTASIS 0.05 % ophthalmic emulsion   2     SUMAtriptan Succinate (IMITREX PO) Take by mouth every 8 hours as needed for migraine         Allergies  Allergies   Allergen Reactions     Penicillin G Hives       Social History  Social History     Social History     Marital status: Single     Spouse name: N/A     Number of children: 0     Years of education: N/A     Occupational History     unemployed      Social History Main Topics     Smoking status: Current Every Day Smoker     Packs/day: 1.00     Years: 14.00     Types: Cigarettes     Smokeless tobacco: Never Used     Alcohol use No      Comment: sober since 7 months 3/2017      Drug use: No     Sexual activity: Yes     Partners: Male     Birth control/ protection: Condom     Other Topics Concern     Not on file     Social History Narrative       Family History  Family History   Problem Relation Age of Onset     Anxiety Disorder Father      Alcoholism Father      Spine Problems Father      Arrhythmia Father      atrial fib     Blood Disease Father      unspecified - blood doesn't clot well     Hypertension Mother      Hyperlipidemia Mother      borderline     CANCER  "Maternal Grandmother      uterine with mets to bones     Alcoholism Maternal Grandfather      Alcoholism Paternal Grandmother      Spine Problems Paternal Grandmother      Migraines Brother      GASTROINTESTINAL DISEASE Brother      IBS     Family History Negative Paternal Grandfather            ROS/MED HX  The complete review of systems is negative other than noted in the HPI or here.     ENT/Pulmonary:     (+)SEBASTIEN risk factors daytime somnolence, tobacco use, Current use 1 packs/day  , . .    Neurologic:     (+)migraines, other neuro right lumbar radiculopathy    Cardiovascular:     (+) ----. : . . . :. . No previous cardiac testing       METS/Exercise Tolerance:  3 - Able to walk 1-2 blocks without stopping   Hematologic:  - neg hematologic  ROS       Musculoskeletal:   (+) , , other musculoskeletal- chronic neck and low back pain, right lumbar radiculopathy      GI/Hepatic:     (+) Other GI/Hepatic rare heartburn      Renal/Genitourinary:     (+) Other Renal/ Genitourinary, endometriosis with heavy menses      Endo:  - neg endo ROS       Psychiatric:     (+) psychiatric history other (comment) (alcohol dependence in remission since 3/2017)      Infectious Disease:  - neg infectious disease ROS       Malignancy:      - no malignancy   Other:    (+) No chance of pregnancy H/O Chronic Pain,               Temp: 98.1  F (36.7  C) Temp src: Oral BP: 134/81 Pulse: 75   Resp: 16 SpO2: 98 %         159 lbs 1.6 oz  5' 3\"   Body mass index is 28.18 kg/(m^2).       Physical Exam  Constitutional: Awake, alert, cooperative, no apparent distress, and appears stated age.  Eyes: Pupils equal, round and reactive to light, extra ocular muscles intact, sclera clear, conjunctiva normal.  HENT: Normocephalic, oral pharynx with moist mucus membranes, good dentition. No goiter appreciated.   Respiratory: Clear to auscultation bilaterally, no crackles or wheezing.  Cardiovascular: Regular rate and rhythm, normal S1 and S2, and no murmur " noted.  Carotids +2, no bruits. No edema. Palpable pulses to radial  DP and PT arteries.   GI: Normal bowel sounds, soft, non-distended, non-tender, no masses palpated, no hepatosplenomegaly.    Lymph/Hematologic: No cervical lymphadenopathy and no supraclavicular lymphadenopathy.  Genitourinary:  deferred  Skin: Warm and dry.  No rashes at anticipated surgical site.   Musculoskeletal: Full ROM of neck. There is no redness, warmth, or swelling of the joints. Gross motor strength is diminished throughout.  Neurologic: Awake, alert, oriented to name, place and time. Cranial nerves II-XII are grossly intact. Gait is impaired.   Neuropsychiatric: Calm, cooperative. Normal affect.     Labs: (personally reviewed)   Component      Latest Ref Rng & Units 1/18/2018   Creatinine      0.52 - 1.04 mg/dL 0.63   GFR Estimate      >60 mL/min/1.7m2 >90   GFR Estimate If Black      >60 mL/min/1.7m2 >90   Hemoglobin      11.7 - 15.7 g/dL 14.2   Potassium      3.4 - 5.3 mmol/L 3.8         Outside records reviewed from: Care Everywhere        ASSESSMENT and PLAN  Gloria Benjamin is a 37 year old female scheduled for a Lumbar 5 to Sacral 1 Decompression on 1/22/2018 by Dr. Bolaños in treatment of bilateral low back pain with lumbar radiculopathy.  PAC referral for risk assessment and optimization for anesthesia with comorbid conditions of: alcohol dependence in remission, chronic neck pain, endometriosis, tobacco use disorder, migraines, and heartburn.    Pre-operative considerations:  1.  Cardiac:  Functional status- METS >4.  She reports that she hasn't been able to do much physical activity over the last few months due to pain, but that 3-4 months ago she walking 3 miles a few times per week for exercise with no complications.  She denies any exertional dyspnea with any of her current daily activities and feels she can still walk at least a block with no problems.  She has no known cardiac history.  Intermediate risk surgery with 0.4%  risk of major adverse cardiac event.    2.  Pulm:  Airway feasible.  SEBASTIEN risk: low.  She smokes 1ppd, but reports that she will be quitting smoking upon her surgery date.  She has nicotine gum at home in preparation.    3.  GI:  Risk of PONV score = 2.  If > 2, anti-emetic intervention recommended. She endorses rare heartburn which she uses over the counter tums to treat.    4. Psych:  Alcohol dependence in remission since March 2017 after completing a treatment program.   5. Neuro:  History of migraines - instructed to refrain from using her Imitrex within the 24 hours prior to surgery.    6. Anesthesia:  The patient reports that she had post-op hypotension with both prior surgeries.       VTE risk:  0.26%    Patient is optimized and is acceptable candidate for the proposed procedure.  No further diagnostic evaluation is needed.       For further details of assessment, testing, and physical exam please see H and P completed on same date.          Rand Valdes DNP, RN, APRN  Preoperative Assessment Center  [facility]  Clinic and Surgery Center  Phone: [phone]  Fax: [phone]

## 2018-01-18 NOTE — PATIENT INSTRUCTIONS
Preparing for Your Surgery      Name:  Gloria Benjamin   MRN:  [id number]   :  1980   Today's Date:  2018     Arriving for surgery:  Surgery date:  2018  Arrival time:  8:30 am  Please come to:     [facility]  [address] [place] [id number]     Parking is available in front of the Clinics and Surgery Center building from 5:30AM to 8:00PM.  -  Proceed to the 5th floor to check into the Ambulatory Surgery Center.              >> There will be patient concierges on the 1st and 5th floor, for assistance or an escort, if you would like.              >> Please call [phone] with any questions.    What can I eat or drink?  -  You may have solid food or milk products until 8 hours prior to your surgery.  -  You may have water, apple juice or 7up/Sprite until 2 hours prior to your surgery.    Which medicines can I take?  -  Do NOT take these medications in the morning, the day of surgery:  imitrex    -  Please take these medications the day of surgery:  Tylenol as needed    How do I prepare myself?  -  Take two showers: one the night before surgery; and one the morning of surgery.         Use Scrubcare or Hibiclens to wash from neck down.  You may use your own shampoo and conditioner. No other hair products.   -  Do NOT use lotion, powder, deodorant, or antiperspirant the day of your surgery.  -  Do NOT wear any makeup, fingernail polish or jewelry.  -Do not bring your own medications to the hospital, except for inhalers and eye drops.  -  Bring your ID and insurance card.    Questions or Concerns:  If you have questions or concerns, please call the  Preoperative Assessment Center, Monday-Friday 7AM-7PM:  [phone]    AFTER YOUR SURGERY  Breathing exercises   Breathing exercises help you recover faster. Take deep breaths and let the air out slowly. This will:     Help you wake up after surgery.    Help prevent complications like pneumonia.  Preventing  complications will help you go home sooner.   We may give you a breathing device (incentive spirometer) to encourage you to breathe deeply.   Nausea and vomiting   You may feel sick to your stomach after surgery; if so, let your nurse know.    Pain control:  After surgery, you may have pain. Our goal is to help you manage your pain. Pain medicine will help you feel comfortable enough to do activities that will help you heal.  These activities may include breathing exercises, walking and physical therapy.   To help your health care team treat your pain we will ask: 1) If you have pain  2) where it is located 3) describe your pain in your words  Methods of pain control include medications given by mouth, vein or by nerve block for some surgeries.  We may give you a pain control pump that will:  1) Deliver the medicine through a tube placed in your vein  2) Control the amount of medicine you receive  3) Allow you to push a button to deliver a dose of pain medicine  Sequential Compression Device (SCD) or Pneumo Boots:  You may need to wear SCD S on your legs or feet. These are wraps connected to a machine that pumps in air and releases it. The repeated pumping helps prevent blood clots from forming.

## 2018-01-18 NOTE — MR AVS SNAPSHOT
After Visit Summary   2018    Gloria Benjamin    MRN: [id number]           Patient Information     Date Of Birth          1980        Visit Information        Provider Department      2018 2:00 PM Rn, [facility] Preoperative Assessment Center        Care Instructions    Preparing for Your Surgery      Name:  Gloria Benjamin   MRN:  [id number]   :  1980   Today's Date:  2018     Arriving for surgery:  Surgery date:  2018  Arrival time:  8:30 am  Please come to:     [facility]  [address] [place] [id number]     Parking is available in front of the Clinics and Surgery Center building from 5:30AM to 8:00PM.  -  Proceed to the 5th floor to check into the Ambulatory Surgery Center.              >> There will be patient concierges on the 1st and 5th floor, for assistance or an escort, if you would like.              >> Please call [phone] with any questions.    What can I eat or drink?  -  You may have solid food or milk products until 8 hours prior to your surgery.  -  You may have water, apple juice or 7up/Sprite until 2 hours prior to your surgery.    Which medicines can I take?  -  Do NOT take these medications in the morning, the day of surgery:  imitrex    -  Please take these medications the day of surgery:  Tylenol as needed    How do I prepare myself?  -  Take two showers: one the night before surgery; and one the morning of surgery.         Use Scrubcare or Hibiclens to wash from neck down.  You may use your own shampoo and conditioner. No other hair products.   -  Do NOT use lotion, powder, deodorant, or antiperspirant the day of your surgery.  -  Do NOT wear any makeup, fingernail polish or jewelry.  -Do not bring your own medications to the hospital, except for inhalers and eye drops.  -  Bring your ID and insurance card.    Questions or Concerns:  If you have questions or concerns, please call the  Preoperative  Assessment Center, Monday-Friday 7AM-7PM:  [phone]    AFTER YOUR SURGERY  Breathing exercises   Breathing exercises help you recover faster. Take deep breaths and let the air out slowly. This will:     Help you wake up after surgery.    Help prevent complications like pneumonia.  Preventing complications will help you go home sooner.   We may give you a breathing device (incentive spirometer) to encourage you to breathe deeply.   Nausea and vomiting   You may feel sick to your stomach after surgery; if so, let your nurse know.    Pain control:  After surgery, you may have pain. Our goal is to help you manage your pain. Pain medicine will help you feel comfortable enough to do activities that will help you heal.  These activities may include breathing exercises, walking and physical therapy.   To help your health care team treat your pain we will ask: 1) If you have pain  2) where it is located 3) describe your pain in your words  Methods of pain control include medications given by mouth, vein or by nerve block for some surgeries.  We may give you a pain control pump that will:  1) Deliver the medicine through a tube placed in your vein  2) Control the amount of medicine you receive  3) Allow you to push a button to deliver a dose of pain medicine  Sequential Compression Device (SCD) or Pneumo Boots:  You may need to wear SCD S on your legs or feet. These are wraps connected to a machine that pumps in air and releases it. The repeated pumping helps prevent blood clots from forming.                 Follow-ups after your visit        Your next 10 appointments already scheduled     Jan 18, 2018  2:40 PM CST   (Arrive by 2:25 PM)   PAC Anesthesia Consult with  Pac Anesthesiologist   [facility] Preoperative Assessment Center ([facility])    [address] [place] [id number] [phone]            Jan 22, 2018   Procedure with Fuad Bolaños MD     [facility] ([facility])    [address] [place] [address]  [address] [facility] [id number]   [phone]           Located in the Clinics and Surgery Center at [address] [place] [id number].   parking is very convenient and highly recommended.  is a $6 flat rate fee.  Both  and self parkers should enter the main arrival plaza from [facility]; parking attendants will direct you based on your parking preference.            Mar 06, 2018 10:40 AM CST   (Arrive by 10:10 AM)   RETURN SPINE with Fuad Bolaños MD   [facility] ([facility])    [address] [place] [address]  [address] [facility] [id number] [phone]            Apr 17, 2018  9:30 AM CDT   (Arrive by 9:00 AM)   RETURN SPINE with Fuad Bolaños MD   [facility] ([facility])    [address] [place] [id number] [phone]              Future tests that were ordered for you today     Open Future Orders        Priority Expected Expires Ordered    Hemoglobin Routine 1/18/2018 2/17/2018 1/18/2018    Potassium Routine 1/18/2018 2/17/2018 1/18/2018    Creatinine Routine 1/18/2018 2/17/2018 1/18/2018            Who to contact     Please call your clinic at [phone] to:    Ask questions about your health    Make or cancel appointments    Discuss your medicines    Learn about your test results    Speak to your doctor   If you have compliments or concerns about an experience at your clinic, or if you wish to file a complaint, please contact [facility] Patient Relations at [phone] or email us at [email or web address]         Additional Information About Your Visit        MyChart Information     [facility] gives you secure access to your electronic health record. If you see a primary care provider, you can also send messages  to your care team and make appointments. If you have questions, please call your primary care clinic.  If you do not have a primary care provider, please call [phone] and they will assist you.      [employer] is an electronic gateway that provides easy, online access to your medical records. With [employer], you can request a clinic appointment, read your test results, renew a prescription or communicate with your care team.     To access your existing account, please contact your [facility] or call [phone] for assistance.        Care EveryWhere ID     This is your Care EveryWhere ID. This could be used by other organizations to access your [facility] medical records  [id number]        Your Vitals Were     Last Period                   01/18/2018            Blood Pressure from Last 3 Encounters:   01/18/18 134/81   12/22/17 136/80   11/09/17 113/80    Weight from Last 3 Encounters:   01/18/18 72.2 kg (159 lb 1.6 oz)   01/12/18 74.9 kg (165 lb 3.2 oz)   12/22/17 70.3 kg (155 lb)              Today, you had the following     No orders found for display       Primary Care Provider Fax #    Physician No Ref-Primary [phone]       No address on file        Equal Access to Services     ANUJA SALVADOR : Kosta Schneider, waaxda glo, qaybta kaalmada ademaria doloresda, jennifer workman. So [facility] [phone].    ATENCIÓN: Si habla español, tiene a rangel disposición servicios gratuitos de asistencia lingüística. Llame al [phone].    We comply with applicable federal civil rights laws and [place] laws. We do not discriminate on the basis of race, color, national origin, age, disability, sex, sexual orientation, or gender identity.            Thank you!     Thank you for choosing [facility] PREOPERATIVE ASSESSMENT CENTER  for your care. Our goal is always to provide you with excellent care. Hearing back from our patients is one way we can continue to  improve our services. Please take a few minutes to complete the written survey that you may receive in the mail after your visit with us. Thank you!             Your Updated Medication List - Protect others around you: Learn how to safely use, store and throw away your medicines at www.disposemymeds.org.          This list is accurate as of: 1/18/18  2:10 PM.  Always use your most recent med list.                   Brand Name Dispense Instructions for use Diagnosis    cyclobenzaprine 5 MG tablet    FLEXERIL     Take 5 mg by mouth 2 times daily as needed for muscle spasms        IMITREX PO      Take by mouth every 8 hours as needed for migraine        nicotine 21 MG/24HR 24 hr patch    NICODERM CQ    30 patch    Place 1 patch onto the skin every 24 hours    Encounter for smoking cessation counseling       RESTASIS 0.05 % ophthalmic emulsion   Generic drug:  cycloSPORINE           TYLENOL EXTRA STRENGTH PO      Take 2 tablets by mouth 4 times daily as needed

## 2018-01-18 NOTE — PROGRESS NOTES
Geraldine Castro,    Your test results are attached.  Your labs are all within normal limits and okay for surgery.          Rand Valdes DNP, RN, ANP-C

## 2018-01-22 ENCOUNTER — RADIANT APPOINTMENT (OUTPATIENT)
Dept: RADIOLOGY | Facility: AMBULATORY SURGERY CENTER | Age: 38
End: 2018-01-22
Attending: ORTHOPAEDIC SURGERY
Payer: COMMERCIAL

## 2018-01-22 ENCOUNTER — HOSPITAL ENCOUNTER (OUTPATIENT)
Facility: AMBULATORY SURGERY CENTER | Age: 38
End: 2018-01-22
Attending: ORTHOPAEDIC SURGERY
Payer: COMMERCIAL

## 2018-01-22 ENCOUNTER — ANESTHESIA (OUTPATIENT)
Dept: SURGERY | Facility: AMBULATORY SURGERY CENTER | Age: 38
End: 2018-01-22

## 2018-01-22 ENCOUNTER — SURGERY (OUTPATIENT)
Age: 38
End: 2018-01-22

## 2018-01-22 VITALS
SYSTOLIC BLOOD PRESSURE: 110 MMHG | DIASTOLIC BLOOD PRESSURE: 60 MMHG | BODY MASS INDEX: 27.46 KG/M2 | RESPIRATION RATE: 12 BRPM | WEIGHT: 155 LBS | TEMPERATURE: 98 F | HEART RATE: 64 BPM | HEIGHT: 63 IN | OXYGEN SATURATION: 99 %

## 2018-01-22 DIAGNOSIS — M54.16 LUMBAR RADICULOPATHY: ICD-10-CM

## 2018-01-22 DIAGNOSIS — M54.41 ACUTE RIGHT-SIDED LOW BACK PAIN WITH RIGHT-SIDED SCIATICA: Primary | ICD-10-CM

## 2018-01-22 LAB
HCG UR QL: NEGATIVE
INTERNAL QC OK POCT: YES

## 2018-01-22 RX ORDER — SODIUM CHLORIDE, SODIUM LACTATE, POTASSIUM CHLORIDE, CALCIUM CHLORIDE 600; 310; 30; 20 MG/100ML; MG/100ML; MG/100ML; MG/100ML
INJECTION, SOLUTION INTRAVENOUS CONTINUOUS
Status: DISCONTINUED | OUTPATIENT
Start: 2018-01-22 | End: 2018-01-22 | Stop reason: HOSPADM

## 2018-01-22 RX ORDER — SODIUM CHLORIDE, SODIUM LACTATE, POTASSIUM CHLORIDE, CALCIUM CHLORIDE 600; 310; 30; 20 MG/100ML; MG/100ML; MG/100ML; MG/100ML
INJECTION, SOLUTION INTRAVENOUS CONTINUOUS
Status: DISCONTINUED | OUTPATIENT
Start: 2018-01-22 | End: 2018-01-23 | Stop reason: HOSPADM

## 2018-01-22 RX ORDER — HYDROXYZINE HYDROCHLORIDE 25 MG/1
25 TABLET, FILM COATED ORAL
Status: DISCONTINUED | OUTPATIENT
Start: 2018-01-22 | End: 2018-01-23 | Stop reason: HOSPADM

## 2018-01-22 RX ORDER — MAGNESIUM HYDROXIDE 1200 MG/15ML
LIQUID ORAL PRN
Status: DISCONTINUED | OUTPATIENT
Start: 2018-01-22 | End: 2018-01-22 | Stop reason: HOSPADM

## 2018-01-22 RX ORDER — OXYCODONE AND ACETAMINOPHEN 5; 325 MG/1; MG/1
1-2 TABLET ORAL EVERY 4 HOURS PRN
Qty: 30 TABLET | Refills: 0 | Status: SHIPPED | OUTPATIENT
Start: 2018-01-22 | End: 2018-01-24

## 2018-01-22 RX ORDER — ONDANSETRON 4 MG/1
4-8 TABLET, ORALLY DISINTEGRATING ORAL EVERY 8 HOURS PRN
Qty: 4 TABLET | Refills: 0 | Status: SHIPPED | OUTPATIENT
Start: 2018-01-22 | End: 2018-12-05

## 2018-01-22 RX ORDER — ONDANSETRON 2 MG/ML
4 INJECTION INTRAMUSCULAR; INTRAVENOUS EVERY 30 MIN PRN
Status: DISCONTINUED | OUTPATIENT
Start: 2018-01-22 | End: 2018-01-23 | Stop reason: HOSPADM

## 2018-01-22 RX ORDER — FENTANYL CITRATE 50 UG/ML
25-50 INJECTION, SOLUTION INTRAMUSCULAR; INTRAVENOUS
Status: DISCONTINUED | OUTPATIENT
Start: 2018-01-22 | End: 2018-01-22 | Stop reason: HOSPADM

## 2018-01-22 RX ORDER — PROPOFOL 10 MG/ML
INJECTION, EMULSION INTRAVENOUS CONTINUOUS PRN
Status: DISCONTINUED | OUTPATIENT
Start: 2018-01-22 | End: 2018-01-22

## 2018-01-22 RX ORDER — AMOXICILLIN 250 MG
1-2 CAPSULE ORAL 2 TIMES DAILY
Qty: 30 TABLET | Refills: 0 | Status: SHIPPED | OUTPATIENT
Start: 2018-01-22 | End: 2018-12-05

## 2018-01-22 RX ORDER — ACETAMINOPHEN 325 MG/1
975 TABLET ORAL ONCE
Status: COMPLETED | OUTPATIENT
Start: 2018-01-22 | End: 2018-01-22

## 2018-01-22 RX ORDER — AMOXICILLIN 250 MG
1-2 CAPSULE ORAL 2 TIMES DAILY
Qty: 30 TABLET | Refills: 0 | Status: SHIPPED | OUTPATIENT
Start: 2018-01-22 | End: 2018-01-24

## 2018-01-22 RX ORDER — ONDANSETRON 2 MG/ML
INJECTION INTRAMUSCULAR; INTRAVENOUS PRN
Status: DISCONTINUED | OUTPATIENT
Start: 2018-01-22 | End: 2018-01-22

## 2018-01-22 RX ORDER — DEXAMETHASONE SODIUM PHOSPHATE 10 MG/ML
INJECTION, SOLUTION INTRAMUSCULAR; INTRAVENOUS PRN
Status: DISCONTINUED | OUTPATIENT
Start: 2018-01-22 | End: 2018-01-22

## 2018-01-22 RX ORDER — OXYCODONE HYDROCHLORIDE 5 MG/1
5 TABLET ORAL
Status: DISCONTINUED | OUTPATIENT
Start: 2018-01-22 | End: 2018-01-23 | Stop reason: HOSPADM

## 2018-01-22 RX ORDER — PROPOFOL 10 MG/ML
INJECTION, EMULSION INTRAVENOUS PRN
Status: DISCONTINUED | OUTPATIENT
Start: 2018-01-22 | End: 2018-01-22

## 2018-01-22 RX ORDER — VANCOMYCIN HYDROCHLORIDE 1 G/20ML
INJECTION, POWDER, LYOPHILIZED, FOR SOLUTION INTRAVENOUS PRN
Status: DISCONTINUED | OUTPATIENT
Start: 2018-01-22 | End: 2018-01-22 | Stop reason: HOSPADM

## 2018-01-22 RX ORDER — HYDROXYZINE HYDROCHLORIDE 25 MG/1
25 TABLET, FILM COATED ORAL EVERY 6 HOURS PRN
Qty: 30 TABLET | Refills: 0 | Status: SHIPPED | OUTPATIENT
Start: 2018-01-22 | End: 2018-01-24

## 2018-01-22 RX ORDER — ONDANSETRON 4 MG/1
4 TABLET, ORALLY DISINTEGRATING ORAL
Status: DISCONTINUED | OUTPATIENT
Start: 2018-01-22 | End: 2018-01-23 | Stop reason: HOSPADM

## 2018-01-22 RX ORDER — ONDANSETRON 4 MG/1
4 TABLET, ORALLY DISINTEGRATING ORAL EVERY 30 MIN PRN
Status: DISCONTINUED | OUTPATIENT
Start: 2018-01-22 | End: 2018-01-23 | Stop reason: HOSPADM

## 2018-01-22 RX ORDER — GABAPENTIN 300 MG/1
300 CAPSULE ORAL ONCE
Status: COMPLETED | OUTPATIENT
Start: 2018-01-22 | End: 2018-01-22

## 2018-01-22 RX ORDER — NALOXONE HYDROCHLORIDE 0.4 MG/ML
.1-.4 INJECTION, SOLUTION INTRAMUSCULAR; INTRAVENOUS; SUBCUTANEOUS
Status: DISCONTINUED | OUTPATIENT
Start: 2018-01-22 | End: 2018-01-23 | Stop reason: HOSPADM

## 2018-01-22 RX ORDER — CLINDAMYCIN PHOSPHATE 900 MG/50ML
900 INJECTION, SOLUTION INTRAVENOUS SEE ADMIN INSTRUCTIONS
Status: DISCONTINUED | OUTPATIENT
Start: 2018-01-22 | End: 2018-01-22 | Stop reason: HOSPADM

## 2018-01-22 RX ORDER — HYDROMORPHONE HYDROCHLORIDE 1 MG/ML
.3-.5 INJECTION, SOLUTION INTRAMUSCULAR; INTRAVENOUS; SUBCUTANEOUS EVERY 10 MIN PRN
Status: DISCONTINUED | OUTPATIENT
Start: 2018-01-22 | End: 2018-01-23 | Stop reason: HOSPADM

## 2018-01-22 RX ORDER — KETAMINE HYDROCHLORIDE 10 MG/ML
INJECTION, SOLUTION INTRAMUSCULAR; INTRAVENOUS PRN
Status: DISCONTINUED | OUTPATIENT
Start: 2018-01-22 | End: 2018-01-22

## 2018-01-22 RX ORDER — ACETAMINOPHEN 325 MG/1
650 TABLET ORAL
Status: DISCONTINUED | OUTPATIENT
Start: 2018-01-22 | End: 2018-01-23 | Stop reason: HOSPADM

## 2018-01-22 RX ORDER — SODIUM CHLORIDE, SODIUM LACTATE, POTASSIUM CHLORIDE, CALCIUM CHLORIDE 600; 310; 30; 20 MG/100ML; MG/100ML; MG/100ML; MG/100ML
INJECTION, SOLUTION INTRAVENOUS CONTINUOUS PRN
Status: DISCONTINUED | OUTPATIENT
Start: 2018-01-22 | End: 2018-01-22

## 2018-01-22 RX ORDER — BUPIVACAINE HYDROCHLORIDE 2.5 MG/ML
INJECTION, SOLUTION INFILTRATION; PERINEURAL PRN
Status: DISCONTINUED | OUTPATIENT
Start: 2018-01-22 | End: 2018-01-22 | Stop reason: HOSPADM

## 2018-01-22 RX ORDER — ONDANSETRON 4 MG/1
4-8 TABLET, ORALLY DISINTEGRATING ORAL EVERY 8 HOURS PRN
Qty: 4 TABLET | Refills: 0 | Status: SHIPPED | OUTPATIENT
Start: 2018-01-22 | End: 2018-01-24

## 2018-01-22 RX ORDER — HYDROXYZINE HYDROCHLORIDE 25 MG/1
25 TABLET, FILM COATED ORAL EVERY 6 HOURS PRN
Qty: 30 TABLET | Refills: 0 | Status: SHIPPED | OUTPATIENT
Start: 2018-01-22 | End: 2018-12-05

## 2018-01-22 RX ORDER — LIDOCAINE 40 MG/G
CREAM TOPICAL
Status: DISCONTINUED | OUTPATIENT
Start: 2018-01-22 | End: 2018-01-22 | Stop reason: HOSPADM

## 2018-01-22 RX ORDER — CLINDAMYCIN PHOSPHATE 900 MG/50ML
900 INJECTION, SOLUTION INTRAVENOUS
Status: COMPLETED | OUTPATIENT
Start: 2018-01-22 | End: 2018-01-22

## 2018-01-22 RX ORDER — LIDOCAINE HYDROCHLORIDE 20 MG/ML
INJECTION, SOLUTION INFILTRATION; PERINEURAL PRN
Status: DISCONTINUED | OUTPATIENT
Start: 2018-01-22 | End: 2018-01-22

## 2018-01-22 RX ADMIN — HYDROMORPHONE HYDROCHLORIDE 0.5 MG: 1 INJECTION, SOLUTION INTRAMUSCULAR; INTRAVENOUS; SUBCUTANEOUS at 13:28

## 2018-01-22 RX ADMIN — ONDANSETRON 4 MG: 2 INJECTION INTRAMUSCULAR; INTRAVENOUS at 11:33

## 2018-01-22 RX ADMIN — Medication 1000 ML: at 12:07

## 2018-01-22 RX ADMIN — PROPOFOL 250 MG: 10 INJECTION, EMULSION INTRAVENOUS at 11:19

## 2018-01-22 RX ADMIN — SODIUM CHLORIDE, SODIUM LACTATE, POTASSIUM CHLORIDE, CALCIUM CHLORIDE: 600; 310; 30; 20 INJECTION, SOLUTION INTRAVENOUS at 11:12

## 2018-01-22 RX ADMIN — ACETAMINOPHEN 975 MG: 325 TABLET ORAL at 08:45

## 2018-01-22 RX ADMIN — DEXAMETHASONE SODIUM PHOSPHATE 10 MG: 10 INJECTION, SOLUTION INTRAMUSCULAR; INTRAVENOUS at 11:33

## 2018-01-22 RX ADMIN — BUPIVACAINE HYDROCHLORIDE 30 ML: 2.5 INJECTION, SOLUTION INFILTRATION; PERINEURAL at 12:51

## 2018-01-22 RX ADMIN — HYDROMORPHONE HYDROCHLORIDE 0.5 MG: 1 INJECTION, SOLUTION INTRAMUSCULAR; INTRAVENOUS; SUBCUTANEOUS at 13:19

## 2018-01-22 RX ADMIN — FENTANYL CITRATE 50 MCG: 50 INJECTION, SOLUTION INTRAMUSCULAR; INTRAVENOUS at 13:16

## 2018-01-22 RX ADMIN — PROPOFOL 50 MG: 10 INJECTION, EMULSION INTRAVENOUS at 12:35

## 2018-01-22 RX ADMIN — MAGNESIUM HYDROXIDE 1000 ML: 1200 LIQUID ORAL at 12:06

## 2018-01-22 RX ADMIN — FENTANYL CITRATE 50 MCG: 50 INJECTION, SOLUTION INTRAMUSCULAR; INTRAVENOUS at 13:11

## 2018-01-22 RX ADMIN — PROPOFOL 200 MCG/KG/MIN: 10 INJECTION, EMULSION INTRAVENOUS at 11:22

## 2018-01-22 RX ADMIN — FENTANYL CITRATE 50 MCG: 50 INJECTION, SOLUTION INTRAMUSCULAR; INTRAVENOUS at 13:13

## 2018-01-22 RX ADMIN — CLINDAMYCIN PHOSPHATE 900 MG: 900 INJECTION, SOLUTION INTRAVENOUS at 11:12

## 2018-01-22 RX ADMIN — GABAPENTIN 300 MG: 300 CAPSULE ORAL at 08:45

## 2018-01-22 RX ADMIN — KETAMINE HYDROCHLORIDE 25 MG: 10 INJECTION, SOLUTION INTRAMUSCULAR; INTRAVENOUS at 11:45

## 2018-01-22 RX ADMIN — FENTANYL CITRATE 50 MCG: 50 INJECTION, SOLUTION INTRAMUSCULAR; INTRAVENOUS at 13:09

## 2018-01-22 RX ADMIN — LIDOCAINE HYDROCHLORIDE 90 MG: 20 INJECTION, SOLUTION INFILTRATION; PERINEURAL at 11:19

## 2018-01-22 RX ADMIN — VANCOMYCIN HYDROCHLORIDE 1 G: 1 INJECTION, POWDER, LYOPHILIZED, FOR SOLUTION INTRAVENOUS at 12:48

## 2018-01-22 RX ADMIN — KETAMINE HYDROCHLORIDE 5 MG: 10 INJECTION, SOLUTION INTRAMUSCULAR; INTRAVENOUS at 12:35

## 2018-01-22 RX ADMIN — Medication 50 MG: at 11:19

## 2018-01-22 NOTE — ANESTHESIA CARE TRANSFER NOTE
Patient: Gloria NEUMANN Loux    Procedure(s):  Lumbar 5 to Sacral 1 Decompression - Wound Class: I-Clean    Diagnosis: Lumbar Radiculopathy  Diagnosis Additional Information: No value filed.    Anesthesia Type:   General, ETT     Note:  Airway :Room Air  Patient transferred to:PACU  Comments: Patient to PACU on RA/native airway and is awake and verbal. Report to RN and transfer of care. BP: 117/82 HR: 77 Temp:97.0 SpO2: 97% in RA RR: 18      Vitals: (Last set prior to Anesthesia Care Transfer)    CRNA VITALS  1/22/2018 1231 - 1/22/2018 1305      1/22/2018             Pulse: 80    SpO2: (!)  89 %    Resp Rate (observed): 13                Electronically Signed By: BEST Huizar CRNA  January 22, 2018  1:05 PM

## 2018-01-22 NOTE — IP AVS SNAPSHOT
[facility]    [address] [place] [id number]    Phone:  [phone]    Fax:  [phone]                                       After Visit Summary   1/22/2018    Gloria Benjamin    MRN: [id number]           After Visit Summary Signature Page     I have received my discharge instructions, and my questions have been answered. I have discussed any challenges I see with this plan with the nurse or doctor.    ..........................................................................................................................................  Patient/Patient Representative Signature      ..........................................................................................................................................  Patient Representative Print Name and Relationship to Patient    ..................................................               ................................................  Date                                            Time    ..........................................................................................................................................  Reviewed by Signature/Title    ...................................................              ..............................................  Date                                                            Time

## 2018-01-22 NOTE — OP NOTE
DATE OF SURGERY: 1/22/2018    PREOPERATIVE DIAGNOSIS: Lumbar Radiculopathy             POSTOPERATIVE DIAGNOSIS: Same    PROCEDURES:  1. L5 and S1 laminectomy and partial medial facetectomies and foraminotomies for decompression of the L5 and S1 nerve roots.    PRIMARY SURGEON: Fuad Bolaños MD    FIRST ASSISTANT: None    ANESTHESIA: General Endotracheal    COMPLICATIONS:  None.    SPECIMENS: None.    ESTIMATED BLOOD LOSS: 50cc    INDICATIONS:                          Gloria Benjamin is a 37 year old female who elected surgical treatment, and understood the indications for this surgery, as well as its risks, benefits, and alternatives as documented in the pre-operative H&P.  Specifically, we reviewed the risks and benefits of the surgery in detail. The risks include, but are not limited to, the general risks associated with anesthesia, including death, pulmonary embolism, DVT, stroke, myocardial infarction, pneumonia, and urinary tract infection. Additional risks specific to the surgery include the risk of infection, dural tear with resultant CSF leak which might necessitate placement of a drain or revision surgery or could result in headaches, nerve injury resulting in weakness or paralysis, risk of adjacent segment disease, the risks of vascular injury, need for revision surgery in the future due to one of the above issues, or risk of incomplete symptom relief. Gloria Benjamin understands the risks of the surgery and wishes to proceed.  No Guarantees were given.       DESCRIPTION OF PROCEDURE:           Gloria Benjamin was taken to the operating room, where the Anesthesiology Service induced satisfactory general anesthesia. Ancef was given IV.  Venous thromboembolic prophylaxis was performed with sequential devices.  A Dempsey catheter was placed under standard sterile techniques.  The patient was placed prone on an open OSI frame with the abdomen hanging free and all bony prominences well padded.  The low back  was then prepped and draped in its entirety in the usual sterile fashion.  We then held a multidisciplinary time out in which we verified the patient, procedure, antibiotics, and operative plan.  All team members were in agreement.    The C-arm was brought into the sterile field to obtain a true lateral view and needles were placed to vicky the intended point of incision.  We made a midline incision and a bilateral subperiosteal exposure was performed of the L5 through S1 spinous processes and medial lamina.  A needle was placed into the medial facet joint at the caudal most level and a final C-arm image was then obtained to verify our position.     The decompression was performed in the same fashion at each level sequentially including the L5 and S1 levels which resulted in the decompression of the L5 and S1 nerve roots.      For each level, the spinous process was removed with a rongeur. The dorsal cortex of the lamina was then thinned with the rongeur.  We palpated the lateral border of the pars to verify the planned width of the decompression.  I used a cautery to vicky out the planned limits of the resection to provide a visual reference.  A 4mm round bur was then used to remove the remaining dorsal cortical and cancellous layers.  Eventually, the ligamentum flavum was uncovered by the bur in the midline.  The proximal origin of the ligamentum flavum  and epidural fat were identified. A curette was used to split and elevate the flavum in the midline, exposing the epidural fat underneath.  Kerrison punches were then used to resect the flavum down to the caudad laminar edge.  At this point the central decompression was complete, and I turned my attention to the partial medial facetectomy.  A 5mm strait osteotome was used to resect the remaining overhanging medial facet.  Where necessary the resection was completed with a kerrison.  This was continued laterally until the medial wall of the pedicle was readily  palpable.  I then completed the foraminotomies.  A juan luis was used to trace out the edge of the pedicle.  I then introduced a 2mm kerrison into the foramen below the pedicle, keeping the shoe of the kerrison facing the nerve root.  Using multiple bites in this way, I was able to remove the remaining facet capsule and osteophytes that were compressing the exiting nerve root.  In the same fashion, I was able to reach out into the foramen above the pedicle and remove any compression on the exiting nerve root above. This process was performed sequentially at each of the levels noted.       In performing the decompression, I found that there was significant ligamentum flavum hypertrophy and epidural lipomatosis which was compressing the traversing S1 nerve root.  At the end of the decompression this was totally free.      The wound was then thoroughly irrigated.  Hemostasis was achieved.  A hemovac drain was not placed.  The wound was closed in layers with vicryl suture, followed by monocryl and dermabond for the skin.  A sterile dressing was applied. The patient was turned supine, extubated, and returned to the recovery area in stable condition.      I was present and scrubbed for the critical portions of the procedure including the exposure and decompression.    Fuad Bolaños MD

## 2018-01-22 NOTE — ANESTHESIA POSTPROCEDURE EVALUATION
Patient: Gloria NEUMANN Loux    Procedure(s):  Lumbar 5 to Sacral 1 Decompression - Wound Class: I-Clean    Diagnosis:Lumbar Radiculopathy  Diagnosis Additional Information: No value filed.    Anesthesia Type:  General, ETT    Note:  Anesthesia Post Evaluation    Patient location during evaluation: Phase 2  Patient participation: Able to fully participate in evaluation  Level of consciousness: awake and alert  Pain management: adequate  Airway patency: patent  Cardiovascular status: acceptable and hemodynamically stable  Respiratory status: acceptable  Hydration status: acceptable  PONV: none     Anesthetic complications: None          Last vitals:  Vitals:    01/22/18 1304 01/22/18 1315 01/22/18 1330   BP: 117/82 113/78 98/69   Pulse: 76 64    Resp: 18 18 12   Temp: 36.6  C (97.9  F) 36  C (96.8  F) 36.7  C (98  F)   SpO2: 98% 98% 97%         Electronically Signed By: Evaristo Angulo MD  January 22, 2018  1:43 PM

## 2018-01-22 NOTE — IP AVS SNAPSHOT
MRN:[id number]                      After Visit Summary   1/22/2018    Gloria Benjamin    MRN: [id number]           Thank you!     Thank you for choosing [facility] for your care. Our goal is always to provide you with excellent care. Hearing back from our patients is one way we can continue to improve our services. Please take a few minutes to complete the written survey that you may receive in the mail after you visit with us. Thank you!        Patient Information     Date Of Birth          1980        About your hospital stay     You were admitted on:  January 22, 2018 [facility]    You were discharged on:  January 22, 2018       Who to Call     For medical emergencies, please call 911.  For non-urgent questions about your medical care, please call your primary care provider or clinic, None  For questions related to your surgery, please call your surgery clinic        Attending Provider     Provider Fuad Harden MD Orthopedics       Primary Care Provider Fax #    Physician No Ref-Primary [phone]      After Care Instructions     Discharge Instructions       Review outpatient procedure discharge instructions as directed by Provider.            Discharge Instructions       Review outpatient procedure discharge instructions as directed by Provider.            Discharge Instructions        Diet as tolerated. Return to diet before surgery.            Notify Provider       Signs and symptoms of infection: Fever greater than 101, redness, swelling, heat at site, drainage, or pus            Shower - Do not soak           Weight bearing - As tolerated       No lifting over 10-15 pounds, no repetitive bending or twisting.            Wound care       Do not immerse wound in water for 5 days.  After 5 days you may remove the dressing and shower over the wound.  Do not put any lotions or creams on.  Just pat it dry  after the shower.            Wound care       Do not immerse wound in water for 5 days.  After 5 days you may remove the dressing and shower over the wound and then simply pat it dry.  Do not apply any creams or lotions.                  Your next 10 appointments already scheduled     Mar 06, 2018 10:40 AM CST   (Arrive by 10:10 AM)   RETURN SPINE with Fuad Bolaños MD   [facility] ([facility])    [address] [place] [id number]   [phone]            Apr 17, 2018  9:30 AM CDT   (Arrive by 9:00 AM)   RETURN SPINE with Fuad Bolaños MD   [facility] ([facility])    [address] [place] [id number] [phone]              Further instructions from your care team       [facility]  Home Care Following Anesthesia  For 24 hours after surgery:  1. Get plenty of rest.  A responsible adult must stay with you for at least 24 hours after you leave the surgery center.  2. Do not drive or use heavy equipment.  If you have weakness or tingling, don't drive or use heavy equipment until this feeling goes away.   3. Do not drink alcohol.   4. Avoid strenuous or risky activities.  Ask for help when climbing stairs.  5. You may feel lightheaded.  IF so, sit for a few minutes before standing.  Have someone help you get up.   6. If you have nausea (feel sick to your stomach): Drink only clear liquids such as apple juice, ginger ale, broth or 7-Up.  Rest may also help.  Be sure to drink enough fluids.  Move to a regular diet as you feel able.   7. You may have a slight fever.  Call the doctor if your fever is over 100 F (37.7 C) (taken under the tongue) or lasts longer than 24 hours.  8. You may have a dry mouth, a sore throat, muscle aches or trouble sleeping. These should go away after 24 hours.  9. Do not make important or legal  decisions.               Tips for taking pain medications  To get the best pain relief possible, remember these points:    Take pain medications as directed, before pain becomes severe.    Pain medication can upset your stomach: taking it with food may help.    Constipation is a common side effect of pain medication. Drink plenty of  fluids.    Eat foods high in fiber. Take a stool softener if recommended by your doctor or pharmacist.    Do not drink alcohol, drive or operate machinery while taking pain medications.    Ask about other ways to control pain, such as with heat, ice or relaxation.    Tylenol/Acetaminophen Consumption  To help encourage the safe use of acetaminophen, the makers of TYLENOL  have lowered the maximum daily dose for single-ingredient Extra Strength TYLENOL  (acetaminophen) products sold in the [place] from 8 pills per day (4,000 mg) to 6 pills per day (3,000 mg). The dosing interval has also changed from 2 pills every 4-6 hours to 2 pills every 6 hours.    If you feel your pain relief is insufficient, you may take Tylenol/Acetaminophen in addition to your narcotic pain medication.     Be careful not to exceed 3,000 mg of Tylenol/Acetaminophen in a 24 hour period from all sources.    If you are taking extra strength Tylenol/acetaminophen (500 mg), the maximum dose is 6 tablets in 24 hours.    If you are taking regular strength acetaminophen (325 mg), the maximum dose is 9 tablets in 24 hours.    Call a doctor for any of the followin. Signs of infection (fever, growing tenderness at the surgery site, a large amount of drainage or bleeding, severe pain, foul-smelling drainage, redness, swelling).  2. It has been over 8 to 10 hours since surgery and you are still not able to urinate (pass water).  3. Headache for over 24 hours.  4. Numbness, tingling or weakness the day after surgery (if you had spinal anesthesia).  Your doctor is:       Dr. Bolaños, Orthopaedics: [phone]               Or  "dial [phone] and ask for the resident on call for:  Orthopaedics  For emergency care, call the:  [facility]:  [phone] (TTY for hearing impaired: [phone])                Additional Information     If you use hormonal birth control (such as the pill, patch, ring or implants): You'll need a second form of birth control for 7 days (condoms, a diaphragm or contraceptive foam). While in the hospital, you received a medicine called Bridion. Your normal birth control will not work as well for a week after taking this medicine.          Pending Results     No orders found from 1/20/2018 to 1/23/2018.            Admission Information     Date & Time Provider Department Dept. Phone    1/22/2018 Fuad Bolaños MD [facility] [phone]      Your Vitals Were     Blood Pressure Pulse Temperature Respirations Height Weight    98/69 64 98  F (36.7  C) (Temporal) 12 1.6 m (5' 3\") 70.3 kg (155 lb)    Last Period Pulse Oximetry BMI (Body Mass Index)             01/15/2018 97% 27.46 kg/m2         [employer] Return Information     [employer] gives you secure access to your electronic health record. If you see a primary care provider, you can also send messages to your care team and make appointments. If you have questions, please call your primary care clinic.  If you do not have a primary care provider, please call [phone] and they will assist you.      [employer] is an electronic gateway that provides easy, online access to your medical records. With [employer], you can request a clinic appointment, read your test results, renew a prescription or communicate with your care team.     To access your existing account, please contact your [facility] or call [phone] for assistance.        Care EveryWhere ID     This is your Care EveryWhere ID. This could be used by other organizations to access your [facility] medical records  [id number]      "   Equal Access to Services     [facility]: Hadii aad ku hadandreypasquale Soaris, waaxda luqadaha, qaybta kaalmaguzman candelario, jennifer workman. So [facility] [phone].    ATENCIÓN: Si habla español, tiene a rangel disposición servicios gratuitos de asistencia lingüística. Llame al [phone].    We comply with applicable federal civil rights laws and [place] laws. We do not discriminate on the basis of race, color, national origin, age, disability, sex, sexual orientation, or gender identity.               Review of your medicines      UNREVIEWED medicines. Ask your doctor about these medicines        Dose / Directions    cyclobenzaprine 5 MG tablet   Commonly known as:  FLEXERIL        Dose:  5 mg   Take 5 mg by mouth 2 times daily as needed for muscle spasms   Refills:  0       IMITREX PO        Take by mouth every 8 hours as needed for migraine   Refills:  0       nicotine 21 MG/24HR 24 hr patch   Commonly known as:  NICODERM CQ   Used for:  Encounter for smoking cessation counseling        Dose:  1 patch   Place 1 patch onto the skin every 24 hours   Quantity:  30 patch   Refills:  0       RESTASIS 0.05 % ophthalmic emulsion   Generic drug:  cycloSPORINE        Refills:  2       TYLENOL EXTRA STRENGTH PO        Dose:  2 tablet   Take 2 tablets by mouth 4 times daily as needed   Refills:  0         START taking        Dose / Directions    * hydrOXYzine 25 MG tablet   Commonly known as:  ATARAX   Used for:  Acute right-sided low back pain with right-sided sciatica        Dose:  25 mg   Take 1 tablet (25 mg) by mouth every 6 hours as needed for itching (and nausea)   Quantity:  30 tablet   Refills:  0       * hydrOXYzine 25 MG tablet   Commonly known as:  ATARAX   Used for:  Acute right-sided low back pain with right-sided sciatica        Dose:  25 mg   Take 1 tablet (25 mg) by mouth every 6 hours as needed for itching (and nausea)   Quantity:  30 tablet   Refills:  0       * ondansetron 4 MG ODT  tab   Commonly known as:  ZOFRAN-ODT   Used for:  Acute right-sided low back pain with right-sided sciatica        Dose:  4-8 mg   Take 1-2 tablets (4-8 mg) by mouth every 8 hours as needed for nausea Dissolve ON the tongue.   Quantity:  4 tablet   Refills:  0       * ondansetron 4 MG ODT tab   Commonly known as:  ZOFRAN-ODT   Used for:  Acute right-sided low back pain with right-sided sciatica        Dose:  4-8 mg   Take 1-2 tablets (4-8 mg) by mouth every 8 hours as needed for nausea Dissolve ON the tongue.   Quantity:  4 tablet   Refills:  0       * oxyCODONE-acetaminophen 5-325 MG per tablet   Commonly known as:  PERCOCET   Used for:  Acute right-sided low back pain with right-sided sciatica        Dose:  1-2 tablet   Take 1-2 tablets by mouth every 4 hours as needed for pain (moderate to severe)   Quantity:  30 tablet   Refills:  0       * oxyCODONE-acetaminophen 5-325 MG per tablet   Commonly known as:  PERCOCET   Used for:  Acute right-sided low back pain with right-sided sciatica        Dose:  1-2 tablet   Take 1-2 tablets by mouth every 4 hours as needed for pain (moderate to severe)   Quantity:  30 tablet   Refills:  0       * senna-docusate 8.6-50 MG per tablet   Commonly known as:  SENOKOT-S;PERICOLACE   Used for:  Acute right-sided low back pain with right-sided sciatica        Dose:  1-2 tablet   Take 1-2 tablets by mouth 2 times daily Take while on oral narcotics to prevent or treat constipation.   Quantity:  30 tablet   Refills:  0       * senna-docusate 8.6-50 MG per tablet   Commonly known as:  SENOKOT-S;PERICOLACE   Used for:  Acute right-sided low back pain with right-sided sciatica        Dose:  1-2 tablet   Take 1-2 tablets by mouth 2 times daily Take while on oral narcotics to prevent or treat constipation.   Quantity:  30 tablet   Refills:  0       * Notice:  This list has 8 medication(s) that are the same as other medications prescribed for you. Read the directions carefully, and ask your  doctor or other care provider to review them with you.         Where to get your medicines      These medications were sent to [place], [place] - [address]  [address], [facility] [address]    Hours:  TRANSPLANT PHONE NUMBER [phone] Phone:  [phone]     hydrOXYzine 25 MG tablet    hydrOXYzine 25 MG tablet    ondansetron 4 MG ODT tab    ondansetron 4 MG ODT tab    senna-docusate 8.6-50 MG per tablet    senna-docusate 8.6-50 MG per tablet         Some of these will need a paper prescription and others can be bought over the counter. Ask your nurse if you have questions.     Bring a paper prescription for each of these medications     oxyCODONE-acetaminophen 5-325 MG per tablet    oxyCODONE-acetaminophen 5-325 MG per tablet                Protect others around you: Learn how to safely use, store and throw away your medicines at www.disposemymeds.org.             Medication List: This is a list of all your medications and when to take them. Check marks below indicate your daily home schedule. Keep this list as a reference.      Medications           Morning Afternoon Evening Bedtime As Needed    cyclobenzaprine 5 MG tablet   Commonly known as:  FLEXERIL   Take 5 mg by mouth 2 times daily as needed for muscle spasms                                * hydrOXYzine 25 MG tablet   Commonly known as:  ATARAX   Take 1 tablet (25 mg) by mouth every 6 hours as needed for itching (and nausea)                                * hydrOXYzine 25 MG tablet   Commonly known as:  ATARAX   Take 1 tablet (25 mg) by mouth every 6 hours as needed for itching (and nausea)                                IMITREX PO   Take by mouth every 8 hours as needed for migraine                                nicotine 21 MG/24HR 24 hr patch   Commonly known as:  NICODERM CQ   Place 1 patch onto the skin every 24 hours                                * ondansetron 4 MG ODT tab    Commonly known as:  ZOFRAN-ODT   Take 1-2 tablets (4-8 mg) by mouth every 8 hours as needed for nausea Dissolve ON the tongue.                                * ondansetron 4 MG ODT tab   Commonly known as:  ZOFRAN-ODT   Take 1-2 tablets (4-8 mg) by mouth every 8 hours as needed for nausea Dissolve ON the tongue.                                * oxyCODONE-acetaminophen 5-325 MG per tablet   Commonly known as:  PERCOCET   Take 1-2 tablets by mouth every 4 hours as needed for pain (moderate to severe)                                * oxyCODONE-acetaminophen 5-325 MG per tablet   Commonly known as:  PERCOCET   Take 1-2 tablets by mouth every 4 hours as needed for pain (moderate to severe)                                RESTASIS 0.05 % ophthalmic emulsion   Generic drug:  cycloSPORINE                                * senna-docusate 8.6-50 MG per tablet   Commonly known as:  SENOKOT-S;PERICOLACE   Take 1-2 tablets by mouth 2 times daily Take while on oral narcotics to prevent or treat constipation.                                * senna-docusate 8.6-50 MG per tablet   Commonly known as:  SENOKOT-S;PERICOLACE   Take 1-2 tablets by mouth 2 times daily Take while on oral narcotics to prevent or treat constipation.                                TYLENOL EXTRA STRENGTH PO   Take 2 tablets by mouth 4 times daily as needed                                * Notice:  This list has 8 medication(s) that are the same as other medications prescribed for you. Read the directions carefully, and ask your doctor or other care provider to review them with you.

## 2018-01-22 NOTE — DISCHARGE INSTRUCTIONS
[facility]  Home Care Following Anesthesia  For 24 hours after surgery:  1. Get plenty of rest.  A responsible adult must stay with you for at least 24 hours after you leave the surgery center.  2. Do not drive or use heavy equipment.  If you have weakness or tingling, don't drive or use heavy equipment until this feeling goes away.   3. Do not drink alcohol.   4. Avoid strenuous or risky activities.  Ask for help when climbing stairs.  5. You may feel lightheaded.  IF so, sit for a few minutes before standing.  Have someone help you get up.   6. If you have nausea (feel sick to your stomach): Drink only clear liquids such as apple juice, ginger ale, broth or 7-Up.  Rest may also help.  Be sure to drink enough fluids.  Move to a regular diet as you feel able.   7. You may have a slight fever.  Call the doctor if your fever is over 100 F (37.7 C) (taken under the tongue) or lasts longer than 24 hours.  8. You may have a dry mouth, a sore throat, muscle aches or trouble sleeping. These should go away after 24 hours.  9. Do not make important or legal decisions.               Tips for taking pain medications  To get the best pain relief possible, remember these points:    Take pain medications as directed, before pain becomes severe.    Pain medication can upset your stomach: taking it with food may help.    Constipation is a common side effect of pain medication. Drink plenty of  fluids.    Eat foods high in fiber. Take a stool softener if recommended by your doctor or pharmacist.    Do not drink alcohol, drive or operate machinery while taking pain medications.    Ask about other ways to control pain, such as with heat, ice or relaxation.    Tylenol/Acetaminophen Consumption  To help encourage the safe use of acetaminophen, the makers of TYLENOL  have lowered the maximum daily dose for single-ingredient Extra Strength TYLENOL  (acetaminophen) products sold in the [place] from 8  pills per day (4,000 mg) to 6 pills per day (3,000 mg). The dosing interval has also changed from 2 pills every 4-6 hours to 2 pills every 6 hours.    If you feel your pain relief is insufficient, you may take Tylenol/Acetaminophen in addition to your narcotic pain medication.     Be careful not to exceed 3,000 mg of Tylenol/Acetaminophen in a 24 hour period from all sources.    If you are taking extra strength Tylenol/acetaminophen (500 mg), the maximum dose is 6 tablets in 24 hours.    If you are taking regular strength acetaminophen (325 mg), the maximum dose is 9 tablets in 24 hours.    Call a doctor for any of the followin. Signs of infection (fever, growing tenderness at the surgery site, a large amount of drainage or bleeding, severe pain, foul-smelling drainage, redness, swelling).  2. It has been over 8 to 10 hours since surgery and you are still not able to urinate (pass water).  3. Headache for over 24 hours.  4. Numbness, tingling or weakness the day after surgery (if you had spinal anesthesia).  Your doctor is:       Dr. Bolaños, Orthopaedics: [phone]               Or dial [phone] and ask for the resident on call for:  Orthopaedics  For emergency care, call the:  [facility]:  [phone] (TTY for hearing impaired: [phone])

## 2018-01-24 DIAGNOSIS — M54.41 ACUTE RIGHT-SIDED LOW BACK PAIN WITH RIGHT-SIDED SCIATICA: ICD-10-CM

## 2018-01-24 RX ORDER — OXYCODONE AND ACETAMINOPHEN 5; 325 MG/1; MG/1
1-2 TABLET ORAL EVERY 4 HOURS PRN
Qty: 30 TABLET | Refills: 0 | Status: SHIPPED | OUTPATIENT
Start: 2018-01-24 | End: 2018-01-29

## 2018-01-24 NOTE — TELEPHONE ENCOUNTER
Pt calls for refill of percocet. She is s/p L5-S1 decompression on 01/22/18. Refilled per standing order. Pt mom Jeannine to p/u from pharmacy on 01/25/18. Rx signed and brought to pharmacy.

## 2018-01-29 DIAGNOSIS — M54.41 ACUTE RIGHT-SIDED LOW BACK PAIN WITH RIGHT-SIDED SCIATICA: ICD-10-CM

## 2018-01-29 RX ORDER — OXYCODONE AND ACETAMINOPHEN 5; 325 MG/1; MG/1
1-2 TABLET ORAL EVERY 4 HOURS PRN
Qty: 30 TABLET | Refills: 0 | Status: SHIPPED | OUTPATIENT
Start: 2018-01-29 | End: 2018-12-05

## 2018-01-29 NOTE — TELEPHONE ENCOUNTER
Orthopedic pt of Dr. Bolaños s/p L5 and S1 laminectomy, partial medial facetectomies, and foraminotomies for decompression of the L5 and S1 nerve roots on 1/22/2018. Gloria calls today for refill of Percocet. She rates her pain now at 7/10 and is taking 1-2 tabs every 4 hours. She requests [place] be [place] [facility] [place].   Signed Rx taken to [facility] as requested.

## 2018-02-02 DIAGNOSIS — Z98.890 S/P SPINAL SURGERY: Primary | ICD-10-CM

## 2018-02-02 RX ORDER — HYDROCODONE BITARTRATE AND ACETAMINOPHEN 5; 325 MG/1; MG/1
1-2 TABLET ORAL EVERY 6 HOURS PRN
Qty: 30 TABLET | Refills: 0 | Status: SHIPPED | OUTPATIENT
Start: 2018-02-02 | End: 2018-02-07

## 2018-02-02 NOTE — TELEPHONE ENCOUNTER
DOS: 1/22/18 Lumbar spine decompression    Patient calls today requesting a refill before the weekend. She states she is using her pain medications every 5-6 hours, 1 tablet. She takes 2 tablets at bedtime. She is continuing to ice. Her pain level is 6-7/10. She does request to step down from Percocet to Vicodin at this time. She requests the Rx be delivered [place] [facility] [place]. Signed prescription delivered to [facility].

## 2018-02-07 ENCOUNTER — TELEPHONE (OUTPATIENT)
Dept: ORTHOPEDICS | Facility: CLINIC | Age: 38
End: 2018-02-07

## 2018-02-07 DIAGNOSIS — Z98.890 S/P SPINAL SURGERY: ICD-10-CM

## 2018-02-07 RX ORDER — HYDROCODONE BITARTRATE AND ACETAMINOPHEN 5; 325 MG/1; MG/1
1-2 TABLET ORAL EVERY 6 HOURS PRN
Qty: 30 TABLET | Refills: 0 | Status: SHIPPED | OUTPATIENT
Start: 2018-02-07 | End: 2018-02-12

## 2018-02-07 NOTE — TELEPHONE ENCOUNTER
Patient requests a refill of Vicodin.  She is S/P L5 and S1 laminectomy 01/22/2018.  She rates her pain 6/10 and states she takes 1 tablet every 6 hours during the day and 2 tablets during the night.      Vicodin refilled per standing order Dr Bolaños.  She will  the prescription at the [facility]

## 2018-02-12 ENCOUNTER — TELEPHONE (OUTPATIENT)
Dept: ORTHOPEDICS | Facility: CLINIC | Age: 38
End: 2018-02-12

## 2018-02-12 DIAGNOSIS — Z98.890 S/P SPINAL SURGERY: ICD-10-CM

## 2018-02-12 RX ORDER — HYDROCODONE BITARTRATE AND ACETAMINOPHEN 5; 325 MG/1; MG/1
1-2 TABLET ORAL EVERY 6 HOURS PRN
Qty: 30 TABLET | Refills: 0 | Status: SHIPPED | OUTPATIENT
Start: 2018-02-12 | End: 2018-12-05

## 2018-02-12 NOTE — TELEPHONE ENCOUNTER
"Patient called requesting a refill of Vicodin.  Patient states she is taking 1-2 tablets every 6 hours and has \"4 or 5\" tablets remaining.  Patient's request was given to OLIVE Ventura for Dr. Bolaños.    Carisa Martin LPN      "

## 2018-02-12 NOTE — TELEPHONE ENCOUNTER
Pt calls for refill of Norco. Rx filled per standing order. Pt will  med [place] [facility].

## 2018-02-27 DIAGNOSIS — G89.18 POSTOPERATIVE PAIN: Primary | ICD-10-CM

## 2018-02-27 RX ORDER — MELOXICAM 7.5 MG/1
7.5 TABLET ORAL DAILY
Qty: 30 TABLET | Refills: 0 | Status: SHIPPED | OUTPATIENT
Start: 2018-02-27 | End: 2018-12-05

## 2018-02-27 NOTE — TELEPHONE ENCOUNTER
Orthopedic pt of Dr. Bolaños s/p  L5 and S1 laminectomy and partial medial facetectomies and foraminotomies for decompression of the L5 and S1 nerve roots on 1/22/2018. Gloria calls today requesting an Rx for a prescription strength NSAID.   Per Dr. Bolaños, Rx for Mobic sent to pt's pharmacy. Pt informed.

## 2018-03-01 DIAGNOSIS — M54.50 LUMBAGO: Primary | ICD-10-CM

## 2018-03-09 ENCOUNTER — RADIANT APPOINTMENT (OUTPATIENT)
Dept: GENERAL RADIOLOGY | Facility: CLINIC | Age: 38
End: 2018-03-09
Attending: ORTHOPAEDIC SURGERY
Payer: MEDICAID

## 2018-03-09 ENCOUNTER — OFFICE VISIT (OUTPATIENT)
Dept: ORTHOPEDICS | Facility: CLINIC | Age: 38
End: 2018-03-09
Payer: MEDICAID

## 2018-03-09 VITALS — BODY MASS INDEX: 28.53 KG/M2 | WEIGHT: 161 LBS | HEIGHT: 63 IN

## 2018-03-09 DIAGNOSIS — Z98.890 S/P SPINAL SURGERY: Primary | ICD-10-CM

## 2018-03-09 DIAGNOSIS — M54.50 LUMBAGO: ICD-10-CM

## 2018-03-09 ASSESSMENT — ENCOUNTER SYMPTOMS
WEIGHT GAIN: 1
ARTHRALGIAS: 1
DEPRESSION: 1
NUMBNESS: 1
FATIGUE: 1
DECREASED CONCENTRATION: 1
NECK PAIN: 1
PANIC: 1
MYALGIAS: 1
HEADACHES: 1
BACK PAIN: 1
MUSCLE CRAMPS: 1
NERVOUS/ANXIOUS: 1
MUSCLE WEAKNESS: 1
WEAKNESS: 1
STIFFNESS: 1
INSOMNIA: 1

## 2018-03-09 NOTE — NURSING NOTE
"Reason For Visit:   Chief Complaint   Patient presents with     RECHECK     pt is here to follow up from surger 01/22/2018 patient states she is having moderate pain today.       Primary MD: No Ref-Primary, Physician  Ref. MD:     ?  No  Occupation None.  Currently working? No.  Work status?  unemployed.  Smoker: Yes  Request smoking cessation information: No  Date of surgery:01/22/2018  Type of surgery: . L5 and S1 laminectomy and partial medial facetectomies and foraminotomies for decompression of the L5 and S1 nerve roots.      Ht 1.6 m (5' 3\")  Wt 73 kg (161 lb)  BMI 28.52 kg/m2    Pain Assessment  Patient Currently in Pain: Yes  0-10 Pain Scale: 4  Primary Pain Location: Back    Oswestry (ANA LAURA) Questionnaire    OSWESTRY DISABILITY INDEX 3/9/2018   Count 10   Sum 28   Oswestry Score (%) 56   Some recent data might be hidden            Neck Disability Index (NDI) Questionnaire    No flowsheet data found.           Visual Analog Pain Scale  Back Pain Scale 0-10: 5  Right leg pain: 5  Left leg pain: 0    Promis 10 Assessment    PROMIS 10 3/9/2018   In general, would you say your health is: Good   In general, would you say your quality of life is: Fair   In general, how would you rate your physical health? Poor   In general, how would you rate your mental health, including your mood and your ability to think? Fair   In general, how would you rate your satisfaction with your social activities and relationships? Fair   In general, please rate how well you carry out your usual social activities and roles Poor   To what extent are you able to carry out your everyday physical activities such as walking, climbing stairs, carrying groceries, or moving a chair? A little   How often have you been bothered by emotional problems such as feeling anxious, depressed or irritable? Often   How would you rate your fatigue on average? Moderate   How would you rate your pain on average?   0 = No Pain  to  10 = Worst " Imaginable Pain 4   In general, would you say your health is: 3   In general, would you say your quality of life is: 2   In general, how would you rate your physical health? 1   In general, how would you rate your mental health, including your mood and your ability to think? 2   In general, how would you rate your satisfaction with your social activities and relationships? 2   In general, please rate how well you carry out your usual social activities and roles. (This includes activities at home, at work and in your community, and responsibilities as a parent, child, spouse, employee, friend, etc.) 1   To what extent are you able to carry out your everyday physical activities such as walking, climbing stairs, carrying groceries, or moving a chair? 2   In the past 7 days, how often have you been bothered by emotional problems such as feeling anxious, depressed, or irritable? 4   In the past 7 days, how would you rate your fatigue on average? 3   In the past 7 days, how would you rate your pain on average, where 0 means no pain, and 10 means worst imaginable pain? 4   Global Mental Health Score 8   Global Physical Health Score 9   PROMIS TOTAL - SUBSCORES 17   Some recent data might be hidden                Karla Wall, MILY

## 2018-03-09 NOTE — LETTER
3/9/2018       RE: Gloria Benjamin  [address]  [place] [address]     Dear Colleague,    Thank you for referring your patient, Gloria Benjamin, to the [facility] at [facility]. Please see a copy of my visit note below.    Spine Surgery Return Clinic Visit      Chief Complaint:   RECHECK (pt is here to follow up from surger 01/22/2018 patient states she is having moderate pain today.)      Interval HPI:  Symptom Profile Including: location of symptoms, onset, severity, exacerbating/alleviating factors, previous treatments:        Gloria Benjamin is a 37 year old female who status post L5-S1 lumbar decompression performed January 22, 2018.    She has been progressing well.  Her leg symptoms are improved.  She does still have some stiffness in her back and she feels that stiffness worsening when she is up trying to move around.  The lower extremity numbness and tingling is better than it was before surgery.  The wound has healed well.  She has recently weaned off her narcotics.            Past Medical History:     Past Medical History:   Diagnosis Date     Abnormal Pap smear of cervix date unknown    Pt reported, abnormal pap in late teens, results unknown.     Alcohol dependence in remission (H)     in remission since 3/2017     Back injury      Chronic low back pain      Chronic neck pain      Endometriosis      Lumbar radiculopathy      Migraines      Neck injuries      Tobacco use             Past Surgical History:     Past Surgical History:   Procedure Laterality Date     APPENDECTOMY       DECOMPRESSION LUMBAR MINIMALLY INVASIVE ONE LEVEL N/A 1/22/2018    Procedure: DECOMPRESSION LUMBAR MINIMALLY INVASIVE ONE LEVEL;  Lumbar 5 to Sacral 1 Decompression;  Surgeon: Fuad Bolaños MD;  Location: [facility] OR     GYN SURGERY      Ovarian cyst removed     INJECT EPIDURAL LUMBAR / SACRAL SINGLE Right 12/22/2017    Procedure: INJECT EPIDURAL LUMBAR / SACRAL  SINGLE;  Right Lumbar Transforaminal Epidural Steroid Injection;  Surgeon: Anibal Kinney MD;  Location: [facility] OR            Social History:     Social History   Substance Use Topics     Smoking status: Current Every Day Smoker     Packs/day: 1.00     Years: 14.00     Types: Cigarettes     Smokeless tobacco: Never Used     Alcohol use No      Comment: sober since 7 months 3/2017             Family History:     Family History   Problem Relation Age of Onset     Anxiety Disorder Father      Alcoholism Father      Spine Problems Father      Arrhythmia Father      atrial fib     Blood Disease Father      unspecified - blood doesn't clot well     Hypertension Mother      Hyperlipidemia Mother      borderline     CANCER Maternal Grandmother      uterine with mets to bones     Alcoholism Maternal Grandfather      Alcoholism Paternal Grandmother      Spine Problems Paternal Grandmother      Migraines Brother      GASTROINTESTINAL DISEASE Brother      IBS     Family History Negative Paternal Grandfather             Allergies:     Allergies   Allergen Reactions     Penicillin G Hives            Medications:     Current Outpatient Prescriptions   Medication     meloxicam (MOBIC) 7.5 MG tablet     HYDROcodone-acetaminophen (NORCO) 5-325 MG per tablet     oxyCODONE-acetaminophen (PERCOCET) 5-325 MG per tablet     ondansetron (ZOFRAN-ODT) 4 MG ODT tab     hydrOXYzine (ATARAX) 25 MG tablet     senna-docusate (SENOKOT-S;PERICOLACE) 8.6-50 MG per tablet     Acetaminophen (TYLENOL EXTRA STRENGTH PO)     cyclobenzaprine (FLEXERIL) 5 MG tablet     nicotine (NICODERM CQ) 21 MG/24HR 24 hr patch     RESTASIS 0.05 % ophthalmic emulsion     SUMAtriptan Succinate (IMITREX PO)     No current facility-administered medications for this visit.              Review of Systems:   A focused musculoskeletal and neurologic ROS was performed with pertinent positives and negatives noted in the HPI.  Additional systems were also reviewed and are documented  "at the bottom of the note.         Physical Exam:   Vitals: Ht 1.6 m (5' 3\")  Wt 73 kg (161 lb)  BMI 28.52 kg/m2  Musculoskeletal, Neurologic, and Spine:   Lower extremities are examined.  She is 5 out of 5 in hip flexion, knee extension, knee flexion, tibialis anterior, ankle plantarflexion with sensation intact light touch L3-S1.  Well-healed midline lumbar incision.         Imaging:   We ordered and independently reviewed new radiographs at this clinic visit. The results were discussed with the patient. Findings include:     AP and lateral radiographs today show no evidence of complication.       Assessment and Plan:     37 year old female 6 weeks status post L4-5 decompression with some improvement in leg symptoms but ongoing back pain.    I think most of her pain is due to splinting and some muscle spasm.  Like her to work with physical therapy on core strengthening and lower extremity strengthening and gait training.  I provided some encouragement.  I think it is very positive that her leg symptoms are improved and I think she has further room for improvement of her back pain symptoms.  Follow-up in 6 weeks with repeat AP and lateral lumbar radiographs.      Respectfully,  Fuad Bolaños MD  Spine Surgery  [facility]      "

## 2018-03-09 NOTE — MR AVS SNAPSHOT
After Visit Summary   3/9/2018    Gloria Benjamin    MRN: [id number]           Patient Information     Date Of Birth          1980        Visit Information        Provider Department      3/9/2018 8:20 AM Fuad Bolaños MD [facility]        Today's Diagnoses     S/P spinal surgery    -  1       Follow-ups after your visit        Additional Services     PHYSICAL THERAPY REFERRAL (External-Prints)       Physical Therapy Referral            PHYSICAL THERAPY REFERRAL (External-Prints)       Physical Therapy Referral                  Your next 10 appointments already scheduled     Apr 17, 2018  9:30 AM CDT   (Arrive by 9:00 AM)   RETURN SPINE with Fuad Bolaños MD   [facility] ([facility])    [address]  [address] [place] [address] [phone]              Who to contact     Please call your clinic at [phone] to:    Ask questions about your health    Make or cancel appointments    Discuss your medicines    Learn about your test results    Speak to your doctor            Additional Information About Your Visit        [employer] Information     [employer] gives you secure access to your electronic health record. If you see a primary care provider, you can also send messages to your care team and make appointments. If you have questions, please call your primary care clinic.  If you do not have a primary care provider, please call [phone] and they will assist you.      ShoutEm is an electronic gateway that provides easy, online access to your medical records. With [employer], you can request a clinic appointment, read your test results, renew a prescription or communicate with your care team.     To access your existing account, please contact your [facility] or call [phone] for assistance.        Care EveryWhere ID     This is your Care EveryWhere ID. This could  "be used by other organizations to access your [facility] medical records  [id number]        Your Vitals Were     Height BMI (Body Mass Index)                1.6 m (5' 3\") 28.52 kg/m2           Blood Pressure from Last 3 Encounters:   01/22/18 110/60   01/18/18 134/81   12/22/17 136/80    Weight from Last 3 Encounters:   03/09/18 73 kg (161 lb)   01/22/18 70.3 kg (155 lb)   01/18/18 72.2 kg (159 lb 1.6 oz)              We Performed the Following     PHYSICAL THERAPY REFERRAL (External-Prints)     PHYSICAL THERAPY REFERRAL (External-Prints)        Primary Care Provider Fax #    Physician No Ref-Primary [phone]       No address on file        Equal Access to Services     ANUJA SALVADOR : Kosta Schneider, wajesus luqadaha, qaybta kaalmada [employer], jennifer workman. So [facility] [phone].    ATENCIÓN: Si habla español, tiene a rangel disposición servicios gratuitos de asistencia lingüística. Llame al [phone].    We comply with applicable federal civil rights laws and [place] laws. We do not discriminate on the basis of race, color, national origin, age, disability, sex, sexual orientation, or gender identity.            Thank you!     Thank you for choosing [facility]  for your care. Our goal is always to provide you with excellent care. Hearing back from our patients is one way we can continue to improve our services. Please take a few minutes to complete the written survey that you may receive in the mail after your visit with us. Thank you!             Your Updated Medication List - Protect others around you: Learn how to safely use, store and throw away your medicines at www.disposemymeds.org.          This list is accurate as of 3/9/18 11:18 AM.  Always use your most recent med list.                   Brand Name Dispense Instructions for use Diagnosis    cyclobenzaprine 5 MG tablet    FLEXERIL     Take 5 mg by mouth 2 times daily as needed for muscle " spasms        HYDROcodone-acetaminophen 5-325 MG per tablet    NORCO    30 tablet    Take 1-2 tablets by mouth every 6 hours as needed for moderate to severe pain    S/P spinal surgery       hydrOXYzine 25 MG tablet    ATARAX    30 tablet    Take 1 tablet (25 mg) by mouth every 6 hours as needed for itching (and nausea)    Acute right-sided low back pain with right-sided sciatica       IMITREX PO      Take by mouth every 8 hours as needed for migraine        meloxicam 7.5 MG tablet    MOBIC    30 tablet    Take 1 tablet (7.5 mg) by mouth daily    Postoperative pain       nicotine 21 MG/24HR 24 hr patch    NICODERM CQ    30 patch    Place 1 patch onto the skin every 24 hours    Encounter for smoking cessation counseling       ondansetron 4 MG ODT tab    ZOFRAN-ODT    4 tablet    Take 1-2 tablets (4-8 mg) by mouth every 8 hours as needed for nausea Dissolve ON the tongue.    Acute right-sided low back pain with right-sided sciatica       oxyCODONE-acetaminophen 5-325 MG per tablet    PERCOCET    30 tablet    Take 1-2 tablets by mouth every 4 hours as needed for pain (moderate to severe)    Acute right-sided low back pain with right-sided sciatica       RESTASIS 0.05 % ophthalmic emulsion   Generic drug:  cycloSPORINE           senna-docusate 8.6-50 MG per tablet    SENOKOT-S;PERICOLACE    30 tablet    Take 1-2 tablets by mouth 2 times daily Take while on oral narcotics to prevent or treat constipation.    Acute right-sided low back pain with right-sided sciatica       TYLENOL EXTRA STRENGTH PO      Take 2 tablets by mouth 4 times daily as needed

## 2018-03-09 NOTE — LETTER
3/9/2018      RE: Gloria Benjamin  [address] [facility] [address]  [facility] [address]       Spine Surgery Return Clinic Visit      Chief Complaint:   RECHECK (pt is here to follow up from surger 01/22/2018 patient states she is having moderate pain today.)      Interval HPI:  Symptom Profile Including: location of symptoms, onset, severity, exacerbating/alleviating factors, previous treatments:        Gloria Benjamin is a 37 year old female who status post L5-S1 lumbar decompression performed January 22, 2018.    She has been progressing well.  Her leg symptoms are improved.  She does still have some stiffness in her back and she feels that stiffness worsening when she is up trying to move around.  The lower extremity numbness and tingling is better than it was before surgery.  The wound has healed well.  She has recently weaned off her narcotics.            Past Medical History:     Past Medical History:   Diagnosis Date     Abnormal Pap smear of cervix date unknown    Pt reported, abnormal pap in late teens, results unknown.     Alcohol dependence in remission (H)     in remission since 3/2017     Back injury      Chronic low back pain      Chronic neck pain      Endometriosis      Lumbar radiculopathy      Migraines      Neck injuries      Tobacco use             Past Surgical History:     Past Surgical History:   Procedure Laterality Date     APPENDECTOMY       DECOMPRESSION LUMBAR MINIMALLY INVASIVE ONE LEVEL N/A 1/22/2018    Procedure: DECOMPRESSION LUMBAR MINIMALLY INVASIVE ONE LEVEL;  Lumbar 5 to Sacral 1 Decompression;  Surgeon: Fuad Bolaños MD;  Location:  OR     GYN SURGERY      Ovarian cyst removed     INJECT EPIDURAL LUMBAR / SACRAL SINGLE Right 12/22/2017    Procedure: INJECT EPIDURAL LUMBAR / SACRAL SINGLE;  Right Lumbar Transforaminal Epidural Steroid Injection;  Surgeon: Anibal Kinney MD;  Location:  OR            Social History:     Social History   Substance Use Topics     Smoking status:  "Current Every Day Smoker     Packs/day: 1.00     Years: 14.00     Types: Cigarettes     Smokeless tobacco: Never Used     Alcohol use No      Comment: sober since 7 months 3/2017             Family History:     Family History   Problem Relation Age of Onset     Anxiety Disorder Father      Alcoholism Father      Spine Problems Father      Arrhythmia Father      atrial fib     Blood Disease Father      unspecified - blood doesn't clot well     Hypertension Mother      Hyperlipidemia Mother      borderline     CANCER Maternal Grandmother      uterine with mets to bones     Alcoholism Maternal Grandfather      Alcoholism Paternal Grandmother      Spine Problems Paternal Grandmother      Migraines Brother      GASTROINTESTINAL DISEASE Brother      IBS     Family History Negative Paternal Grandfather             Allergies:     Allergies   Allergen Reactions     Penicillin G Hives            Medications:     Current Outpatient Prescriptions   Medication     meloxicam (MOBIC) 7.5 MG tablet     HYDROcodone-acetaminophen (NORCO) 5-325 MG per tablet     oxyCODONE-acetaminophen (PERCOCET) 5-325 MG per tablet     ondansetron (ZOFRAN-ODT) 4 MG ODT tab     hydrOXYzine (ATARAX) 25 MG tablet     senna-docusate (SENOKOT-S;PERICOLACE) 8.6-50 MG per tablet     Acetaminophen (TYLENOL EXTRA STRENGTH PO)     cyclobenzaprine (FLEXERIL) 5 MG tablet     nicotine (NICODERM CQ) 21 MG/24HR 24 hr patch     RESTASIS 0.05 % ophthalmic emulsion     SUMAtriptan Succinate (IMITREX PO)     No current facility-administered medications for this visit.              Review of Systems:   A focused musculoskeletal and neurologic ROS was performed with pertinent positives and negatives noted in the HPI.  Additional systems were also reviewed and are documented at the bottom of the note.         Physical Exam:   Vitals: Ht 1.6 m (5' 3\")  Wt 73 kg (161 lb)  BMI 28.52 kg/m2  Musculoskeletal, Neurologic, and Spine:   Lower extremities are examined.  She is 5 " out of 5 in hip flexion, knee extension, knee flexion, tibialis anterior, ankle plantarflexion with sensation intact light touch L3-S1.  Well-healed midline lumbar incision.         Imaging:   We ordered and independently reviewed new radiographs at this clinic visit. The results were discussed with the patient. Findings include:     AP and lateral radiographs today show no evidence of complication.       Assessment and Plan:     37 year old female 6 weeks status post L4-5 decompression with some improvement in leg symptoms but ongoing back pain.    I think most of her pain is due to splinting and some muscle spasm.  Like her to work with physical therapy on core strengthening and lower extremity strengthening and gait training.  I provided some encouragement.  I think it is very positive that her leg symptoms are improved and I think she has further room for improvement of her back pain symptoms.  Follow-up in 6 weeks with repeat AP and lateral lumbar radiographs.           Respectfully,  Fuad Bolaños MD  Spine Surgery  [facility]

## 2018-03-09 NOTE — PROGRESS NOTES
Spine Surgery Return Clinic Visit      Chief Complaint:   RECHECK (pt is here to follow up from surger 01/22/2018 patient states she is having moderate pain today.)      Interval HPI:  Symptom Profile Including: location of symptoms, onset, severity, exacerbating/alleviating factors, previous treatments:        Gloria Benjamin is a 37 year old female who status post L5-S1 lumbar decompression performed January 22, 2018.    She has been progressing well.  Her leg symptoms are improved.  She does still have some stiffness in her back and she feels that stiffness worsening when she is up trying to move around.  The lower extremity numbness and tingling is better than it was before surgery.  The wound has healed well.  She has recently weaned off her narcotics.            Past Medical History:     Past Medical History:   Diagnosis Date     Abnormal Pap smear of cervix date unknown    Pt reported, abnormal pap in late teens, results unknown.     Alcohol dependence in remission (H)     in remission since 3/2017     Back injury      Chronic low back pain      Chronic neck pain      Endometriosis      Lumbar radiculopathy      Migraines      Neck injuries      Tobacco use             Past Surgical History:     Past Surgical History:   Procedure Laterality Date     APPENDECTOMY       DECOMPRESSION LUMBAR MINIMALLY INVASIVE ONE LEVEL N/A 1/22/2018    Procedure: DECOMPRESSION LUMBAR MINIMALLY INVASIVE ONE LEVEL;  Lumbar 5 to Sacral 1 Decompression;  Surgeon: Fuad Bolaños MD;  Location: [facility] OR     GYN SURGERY      Ovarian cyst removed     INJECT EPIDURAL LUMBAR / SACRAL SINGLE Right 12/22/2017    Procedure: INJECT EPIDURAL LUMBAR / SACRAL SINGLE;  Right Lumbar Transforaminal Epidural Steroid Injection;  Surgeon: Anibal Kinney MD;  Location:  OR            Social History:     Social History   Substance Use Topics     Smoking status: Current Every Day Smoker     Packs/day: 1.00     Years: 14.00     Types:  "Cigarettes     Smokeless tobacco: Never Used     Alcohol use No      Comment: sober since 7 months 3/2017             Family History:     Family History   Problem Relation Age of Onset     Anxiety Disorder Father      Alcoholism Father      Spine Problems Father      Arrhythmia Father      atrial fib     Blood Disease Father      unspecified - blood doesn't clot well     Hypertension Mother      Hyperlipidemia Mother      borderline     CANCER Maternal Grandmother      uterine with mets to bones     Alcoholism Maternal Grandfather      Alcoholism Paternal Grandmother      Spine Problems Paternal Grandmother      Migraines Brother      GASTROINTESTINAL DISEASE Brother      IBS     Family History Negative Paternal Grandfather             Allergies:     Allergies   Allergen Reactions     Penicillin G Hives            Medications:     Current Outpatient Prescriptions   Medication     meloxicam (MOBIC) 7.5 MG tablet     HYDROcodone-acetaminophen (NORCO) 5-325 MG per tablet     oxyCODONE-acetaminophen (PERCOCET) 5-325 MG per tablet     ondansetron (ZOFRAN-ODT) 4 MG ODT tab     hydrOXYzine (ATARAX) 25 MG tablet     senna-docusate (SENOKOT-S;PERICOLACE) 8.6-50 MG per tablet     Acetaminophen (TYLENOL EXTRA STRENGTH PO)     cyclobenzaprine (FLEXERIL) 5 MG tablet     nicotine (NICODERM CQ) 21 MG/24HR 24 hr patch     RESTASIS 0.05 % ophthalmic emulsion     SUMAtriptan Succinate (IMITREX PO)     No current facility-administered medications for this visit.              Review of Systems:   A focused musculoskeletal and neurologic ROS was performed with pertinent positives and negatives noted in the HPI.  Additional systems were also reviewed and are documented at the bottom of the note.         Physical Exam:   Vitals: Ht 1.6 m (5' 3\")  Wt 73 kg (161 lb)  BMI 28.52 kg/m2  Musculoskeletal, Neurologic, and Spine:   Lower extremities are examined.  She is 5 out of 5 in hip flexion, knee extension, knee flexion, tibialis anterior, " ankle plantarflexion with sensation intact light touch L3-S1.  Well-healed midline lumbar incision.         Imaging:   We ordered and independently reviewed new radiographs at this clinic visit. The results were discussed with the patient. Findings include:     AP and lateral radiographs today show no evidence of complication.       Assessment and Plan:     37 year old female 6 weeks status post L4-5 decompression with some improvement in leg symptoms but ongoing back pain.    I think most of her pain is due to splinting and some muscle spasm.  Like her to work with physical therapy on core strengthening and lower extremity strengthening and gait training.  I provided some encouragement.  I think it is very positive that her leg symptoms are improved and I think she has further room for improvement of her back pain symptoms.  Follow-up in 6 weeks with repeat AP and lateral lumbar radiographs.           Respectfully,  Fuad Bolaños MD  Spine Surgery  [facility]    Answers for HPI/ROS submitted by the patient on 3/9/2018   General Symptoms: Yes  Skin Symptoms: No  HENT Symptoms: Yes  EYE SYMPTOMS: No  HEART SYMPTOMS: No  LUNG SYMPTOMS: No  INTESTINAL SYMPTOMS: No  URINARY SYMPTOMS: No  GYNECOLOGIC SYMPTOMS: No  BREAST SYMPTOMS: No  SKELETAL SYMPTOMS: Yes  BLOOD SYMPTOMS: No  NERVOUS SYSTEM SYMPTOMS: Yes  MENTAL HEALTH SYMPTOMS: Yes  Weight gain: Yes  Fatigue: Yes  Increased stress: Yes  Tinnitus: Yes  Mouth sores: Yes  Back pain: Yes  Muscle aches: Yes  Neck pain: Yes  Joint pain: Yes  Muscle cramps: Yes  Muscle weakness: Yes  Joint stiffness: Yes  Headache: Yes  Weakness: Yes  Difficulty walking: Yes  Numbness: Yes  Nervous or Anxious: Yes  Depression: Yes  Trouble sleeping: Yes  Trouble thinking or concentrating: Yes  Panic attacks: Yes

## 2018-04-12 DIAGNOSIS — M54.50 LUMBAGO: Primary | ICD-10-CM

## 2018-04-26 DIAGNOSIS — M54.50 LUMBAGO: Primary | ICD-10-CM

## 2018-06-30 ENCOUNTER — HEALTH MAINTENANCE LETTER (OUTPATIENT)
Age: 38
End: 2018-06-30

## 2018-08-04 ENCOUNTER — HEALTH MAINTENANCE LETTER (OUTPATIENT)
Age: 38
End: 2018-08-04

## 2018-08-14 PROBLEM — M54.2 CERVICALGIA: Status: RESOLVED | Noted: 2017-11-08 | Resolved: 2018-08-14

## 2018-08-14 PROBLEM — M54.41 ACUTE RIGHT-SIDED LOW BACK PAIN WITH RIGHT-SIDED SCIATICA: Status: RESOLVED | Noted: 2017-11-08 | Resolved: 2018-08-14

## 2018-08-14 NOTE — PROGRESS NOTES
Patient did not return to Physical Therapy to complete their plan of care, thus, full DC status is unknown. Please refer to the progress note dated 1/11/18 or the SOAP note dated 1/17/18 as well as subjective and objective reports copied below from the last visit for discharge information.   Subjective: will be having lumbar decompression surgery 1/22.  this past week has been a little worse, unsure if steroid is wearing off or d/t period discomfort. neck has been sore lately, trying to add in cervical extension to the retractions, it is painful if she is not stretched out yet.  Objective: most of session spent on pt education/discussion regarding upcoming low back surgery. hypomobility noted at mid-upper thoracic C-spine, relief with prone PA mobs.  Their bout of care ranged from 11/8/17 to 1/17/18. Discharge patient from PT at this time.

## 2018-12-05 ENCOUNTER — TELEPHONE (OUTPATIENT)
Dept: OBGYN | Facility: CLINIC | Age: 38
End: 2018-12-05

## 2018-12-05 ENCOUNTER — OFFICE VISIT (OUTPATIENT)
Dept: OBGYN | Facility: CLINIC | Age: 38
End: 2018-12-05
Payer: COMMERCIAL

## 2018-12-05 VITALS — WEIGHT: 158 LBS | DIASTOLIC BLOOD PRESSURE: 70 MMHG | SYSTOLIC BLOOD PRESSURE: 96 MMHG | BODY MASS INDEX: 27.99 KG/M2

## 2018-12-05 DIAGNOSIS — Z23 NEED FOR PROPHYLACTIC VACCINATION AND INOCULATION AGAINST INFLUENZA: ICD-10-CM

## 2018-12-05 DIAGNOSIS — B96.89 BACTERIAL VAGINOSIS: ICD-10-CM

## 2018-12-05 DIAGNOSIS — Z01.419 WELL WOMAN EXAM WITH ROUTINE GYNECOLOGICAL EXAM: Primary | ICD-10-CM

## 2018-12-05 DIAGNOSIS — N76.0 BACTERIAL VAGINOSIS: ICD-10-CM

## 2018-12-05 DIAGNOSIS — N89.8 VAGINAL DISCHARGE: ICD-10-CM

## 2018-12-05 LAB
SPECIMEN SOURCE: ABNORMAL
WET PREP SPEC: ABNORMAL

## 2018-12-05 PROCEDURE — 90686 IIV4 VACC NO PRSV 0.5 ML IM: CPT | Performed by: OBSTETRICS & GYNECOLOGY

## 2018-12-05 PROCEDURE — 99213 OFFICE O/P EST LOW 20 MIN: CPT | Mod: 25 | Performed by: OBSTETRICS & GYNECOLOGY

## 2018-12-05 PROCEDURE — 87210 SMEAR WET MOUNT SALINE/INK: CPT | Performed by: OBSTETRICS & GYNECOLOGY

## 2018-12-05 PROCEDURE — 87624 HPV HI-RISK TYP POOLED RSLT: CPT | Performed by: OBSTETRICS & GYNECOLOGY

## 2018-12-05 PROCEDURE — G0145 SCR C/V CYTO,THINLAYER,RESCR: HCPCS | Performed by: OBSTETRICS & GYNECOLOGY

## 2018-12-05 PROCEDURE — 90471 IMMUNIZATION ADMIN: CPT | Performed by: OBSTETRICS & GYNECOLOGY

## 2018-12-05 PROCEDURE — 99386 PREV VISIT NEW AGE 40-64: CPT | Mod: 25 | Performed by: OBSTETRICS & GYNECOLOGY

## 2018-12-05 RX ORDER — NAPROXEN SODIUM 220 MG
220 TABLET ORAL 2 TIMES DAILY WITH MEALS
Qty: 30 TABLET | Refills: 1 | Status: SHIPPED | OUTPATIENT
Start: 2018-12-05

## 2018-12-05 RX ORDER — METRONIDAZOLE 7.5 MG/G
1 GEL VAGINAL AT BEDTIME
Qty: 70 G | Refills: 3 | Status: SHIPPED | OUTPATIENT
Start: 2018-12-05 | End: 2018-12-10

## 2018-12-05 NOTE — NURSING NOTE
"Chief Complaint   Patient presents with     Consult       Initial BP 96/70  Wt 158 lb (71.7 kg)  LMP 2018  BMI 27.99 kg/m2 Estimated body mass index is 27.99 kg/(m^2) as calculated from the following:    Height as of 3/9/18: 5' 3\" (1.6 m).    Weight as of this encounter: 158 lb (71.7 kg).  BP completed using cuff size: regular    Questioned patient about current smoking habits.  Pt. has never smoked.          The following HM Due: NONE      The following patient reported/Care Every where data was sent to:  P ABSTRACT QUALITY INITIATIVES [[id number]]        N/a      Martha Fabian MA              "

## 2018-12-05 NOTE — PROGRESS NOTES
Gloria is a 38 year old  female who presents for annual exam.     Menses are irregular and heavy and irregular lasting 5 days.  Menses flow: normal.  Patient's last menstrual period was 2018.. Using condoms for contraception.  She is not currently considering pregnancy.  Besides routine health maintenance, she notes heavy menses, periods a little less regular, and a month of spotting in November leading to LMP 20l8.  No spotting since then.  She notes vaginal odor after intercourse.    Menses also painful.  [place] [facility] [place]osis noted.  She took Lupron.    GYNECOLOGIC HISTORY:  Menarche:   Gloria is sexually active with male partner(s) and is currently in monogamous relationship with boyfriend.    History sexually transmitted infections:No STD history  STI testing offered?  Declined  CHITRA exposure: Unknown  History of abnormal Pap smear: NO - age 30- 65 PAP every 3 years recommended  Family history of breast CA: No  Family history of uterine/ovarian CA: No    Family history of colon CA: Unknown    HEALTH MAINTENANCE:  Cholesterol: (No results found for: CHOL History of abnormal lipids: No    Mammo:  . History of abnormal Mammo: Not applicable.  Regular Self Breast Exams: Yes    Current or Past (Physical, Sexual or Emotional):  No  Do you feel safe in your environment:  Yes    Calcium/Vitamin D intake: source:  dairy, dietary supplement(s) Adequate? Yes   Last TDAP?  Offered today? No    TSH: (  TSH   Date Value Ref Range Status   10/24/2017 2.11 0.40 - 4.00 mU/L Final    )  Pap; (  Lab Results   Component Value Date    PAP NIL 2017    )    HISTORY:  Obstetric History       T0      L0     SAB0   TAB0   Ectopic0   Multiple0   Live Births0       # Outcome Date GA Lbr Foreign/2nd Weight Sex Delivery Anes PTL Lv   2 AB            1 AB                 Past Medical History:   Diagnosis Date     Abnormal Pap smear of cervix date unknown    Pt  reported, abnormal pap in late teens, results unknown.     Alcohol dependence in remission (H)     in remission since 3/2017     Back injury      Chronic low back pain      Chronic neck pain      Endometriosis      Lumbar radiculopathy      Migraines      Neck injuries      Tobacco use      Past Surgical History:   Procedure Laterality Date     APPENDECTOMY       DECOMPRESSION LUMBAR MINIMALLY INVASIVE ONE LEVEL N/A 1/22/2018    Procedure: DECOMPRESSION LUMBAR MINIMALLY INVASIVE ONE LEVEL;  Lumbar 5 to Sacral 1 Decompression;  Surgeon: Fuad Bolaños MD;  Location: [facility] OR     GYN SURGERY      Ovarian cyst removed     INJECT EPIDURAL LUMBAR / SACRAL SINGLE Right 12/22/2017    Procedure: INJECT EPIDURAL LUMBAR / SACRAL SINGLE;  Right Lumbar Transforaminal Epidural Steroid Injection;  Surgeon: Anibal Kinney MD;  Location: [facility] OR     Family History   Problem Relation Age of Onset     Anxiety Disorder Father      Alcoholism Father      Spine Problems Father      Arrhythmia Father      atrial fib     Blood Disease Father      unspecified - blood doesn't clot well     Hypertension Mother      Hyperlipidemia Mother      borderline     Cancer Maternal Grandmother      uterine with mets to bones     Alcoholism Maternal Grandfather      Alcoholism Paternal Grandmother      Spine Problems Paternal Grandmother      Migraines Brother      GASTROINTESTINAL DISEASE Brother      IBS     Family History Negative Paternal Grandfather      Social History     Social History     Marital status: Single     Spouse name: N/A     Number of children: 0     Years of education: N/A     Occupational History     unemployed      Social History Main Topics     Smoking status: Current Every Day Smoker     Packs/day: 1.00     Years: 14.00     Types: Cigarettes     Smokeless tobacco: Never Used     Alcohol use No      Comment: sober since 7 months 3/2017      Drug use: No     Sexual activity: Yes     Partners: Male     Birth control/  protection: Condom     Other Topics Concern     None     Social History Narrative       Current Outpatient Prescriptions:      naproxen sodium (ANAPROX) 220 MG tablet, Take 1 tablet (220 mg) by mouth 2 times daily (with meals), Disp: 30 tablet, Rfl: 1     SUMAtriptan Succinate (IMITREX PO), Take by mouth every 8 hours as needed for migraine, Disp: , Rfl:      Allergies   Allergen Reactions     Penicillin G Hives       Past medical, surgical, social and family history were reviewed and updated in EPIC.    ROS:   C:     NEGATIVE for fever, chills, change in weight  I:       NEGATIVE for worrisome rashes, moles or lesions  E:     NEGATIVE for vision changes or irritation  E/M: NEGATIVE for ear, mouth and throat problems  R:     NEGATIVE for significant cough or SOB  CV:   NEGATIVE for chest pain, palpitations or peripheral edema  GI:     NEGATIVE for nausea, abdominal pain, heartburn, or change in bowel habits  :   NEGATIVE for frequency, dysuria, hematuria, vaginal discharge, or irregular bleeding  M:     NEGATIVE for significant arthralgias or myalgia  N:      NEGATIVE for weakness, dizziness or paresthesias  E:      NEGATIVE for temperature intolerance, skin/hair changes  P:      NEGATIVE for changes in mood or affect.    EXAM:  BP 96/70  Wt 158 lb (71.7 kg)  LMP 11/21/2018  BMI 27.99 kg/m2   BMI: Body mass index is 27.99 kg/(m^2).  Constitutional: healthy, alert and no distress  Head: Normocephalic. No masses, lesions, tenderness or abnormalities  Neck: Neck supple. Trachea midline. No adenopathy. Thyroid symmetric, normal size.   Cardiovascular: RRR.   Respiratory: Negative.   Breast: Deferred  Gastrointestinal: Abdomen soft, non-tender, non-distended. No masses, organomegaly.  :  Vulva:  No external lesions, normal female hair distribution, no inguinal adenopathy.    Urethra:  Midline, non-tender, well supported, no discharge  Vagina:  Moist, pink, no abnormal discharge, no lesions  Uterus:  Normal size,  anteflexed  , non-tender, freely mobile  Ovaries:  No masses appreciated, non-tender, mobile  Rectal Exam: deferred  Musculoskeletal: extremities normal  Skin: no suspicious lesions or rashes  Psychiatric: Affect appropriate, cooperative,mentation appears normal.     COUNSELING:   Reviewed preventive health counseling, as reflected in patient instructions   reports that she has been smoking Cigarettes.  She has a 14.00 pack-year smoking history. She has never used smokeless tobacco.  Tobacco Cessation Action Plan: Information offered: Patient not interested at this time (discussed QUIT)   Body mass index is 27.99 kg/(m^2).    FRAX Risk Assessment    ASSESSMENT:  38 year old female with satisfactory annual exam  (Z01.419) Well woman exam with routine gynecological exam  (primary encounter diagnosis)  Comment:   Plan: naproxen sodium (ANAPROX) 220 MG tablet            (N89.8) Vaginal discharge  Comment:   Plan: Wet prep                (Z23) Need for prophylactic vaccination and inoculation against influenza  Comment:  Plan: FLU VACCINE, SPLIT VIRUS, IM (QUADRIVALENT)         [81816]- >3 YRS, Vaccine Administration,         Initial [35373]         Discussed management of heavy menses, discussed Mirena in detail.  She is pretty nervous with pelvic exam, would benefit from mild sedation.  Up to Date information sent to patient.   Recommend mammogram.          Injectable Influenza Immunization Documentation    1.  Is the person to be vaccinated sick today?   No    2. Does the person to be vaccinated have an allergy to a component   of the vaccine?   No  Egg Allergy Algorithm Link    3. Has the person to be vaccinated ever had a serious reaction   to influenza vaccine in the past?   No    4. Has the person to be vaccinated ever had Guillain-Barré syndrome?   No    Form completed by Martha Fabian MA

## 2018-12-05 NOTE — Clinical Note
Please send patient [facility] phone number or information so she can schedule a screening mammogram. Also inform her that wet prep is + for clue cells (BV); she is expecting it to be +, and expecting a Rx for metrogel (sent). Thanks, LT

## 2018-12-05 NOTE — MR AVS SNAPSHOT
After Visit Summary   12/5/2018    Gloria Benjamin    MRN: [id number]           Patient Information     Date Of Birth          1980        Visit Information        Provider Department      12/5/2018 11:30 AM Meena Luevano MD [facility]        Today's Diagnoses     Well woman exam with routine gynecological exam    -  1    Vaginal discharge        Need for prophylactic vaccination and inoculation against influenza        Bacterial vaginosis           Follow-ups after your visit        Follow-up notes from your care team     Return in about 1 year (around 12/5/2019).      Future tests that were ordered for you today     Open Future Orders        Priority Expected Expires Ordered    MA Screening Digital Bilateral Routine  12/5/2019 12/5/2018            Who to contact     If you have questions or need follow up information about today's clinic visit or your schedule please contact [facility] directly at [phone].  Normal or non-critical lab and imaging results will be communicated to you by Robotokihart, letter or phone within 4 business days after the clinic has received the results. If you do not hear from us within 7 days, please contact the clinic through Robotokihart or phone. If you have a critical or abnormal lab result, we will notify you by phone as soon as possible.  Submit refill requests through Partschannel or call your pharmacy and they will forward the refill request to us. Please allow 3 business days for your refill to be completed.          Additional Information About Your Visit        MyChart Information     Partschannel gives you secure access to your electronic health record. If you see a primary care provider, you can also send messages to your care team and make appointments. If you have questions, please call your primary care clinic.  If you do not have a primary care provider, please call [phone] and they will assist you.        Care EveryWhere  ID     This is your Care EveryWhere ID. This could be used by other organizations to access your [place] medical records  [id number]        Your Vitals Were     Last Period BMI (Body Mass Index)                11/21/2018 27.99 kg/m2           Blood Pressure from Last 3 Encounters:   12/05/18 96/70   01/22/18 110/60   01/18/18 134/81    Weight from Last 3 Encounters:   12/05/18 158 lb (71.7 kg)   03/09/18 161 lb (73 kg)   01/22/18 155 lb (70.3 kg)              We Performed the Following     FLU VACCINE, SPLIT VIRUS, IM (QUADRIVALENT) [73591]- >3 YRS     Vaccine Administration, Initial [77651]     Wet prep          Today's Medication Changes          These changes are accurate as of 12/5/18 12:32 PM.  If you have any questions, ask your nurse or doctor.               Start taking these medicines.        Dose/Directions    metroNIDAZOLE 0.75 % vaginal gel   Commonly known as:  METROGEL   Used for:  Bacterial vaginosis, Vaginal discharge   Started by:  Meena Luevano MD        Dose:  1 applicator   Place 1 applicator (5 g) vaginally At Bedtime for 5 days   Quantity:  70 g   Refills:  3       naproxen sodium 220 MG tablet   Commonly known as:  ANAPROX   Used for:  Well woman exam with routine gynecological exam   Started by:  Meena Luevano MD        Dose:  220 mg   Take 1 tablet (220 mg) by mouth 2 times daily (with meals)   Quantity:  30 tablet   Refills:  1            Where to get your medicines      These medications were sent to [facility] [place] [id number]  [address] [facility] [address]     Phone:  [phone]     metroNIDAZOLE 0.75 % vaginal gel    naproxen sodium 220 MG tablet                Primary Care Provider Fax #    Physician No Ref-Primary [phone]       No address on file        Equal Access to Services     ANUJA SALVADOR : Kosta Schneider, jonathan cody, malika candelario, jennifer workman. [facility]  [phone].    ATENCIÓN: Si fiordaliza hayes, tiene a rangel disposición servicios gratuitos de asistencia lingüística. Humberto wiggins [phone].    We comply with applicable federal civil rights laws and [place] laws. We do not discriminate on the basis of race, color, national origin, age, disability, sex, sexual orientation, or gender identity.            Thank you!     Thank you for choosing [facility]  for your care. Our goal is always to provide you with excellent care. Hearing back from our patients is one way we can continue to improve our services. Please take a few minutes to complete the written survey that you may receive in the mail after your visit with us. Thank you!             Your Updated Medication List - Protect others around you: Learn how to safely use, store and throw away your medicines at www.disposemymeds.org.          This list is accurate as of 12/5/18 12:32 PM.  Always use your most recent med list.                   Brand Name Dispense Instructions for use Diagnosis    IMITREX PO      Take by mouth every 8 hours as needed for migraine        metroNIDAZOLE 0.75 % vaginal gel    METROGEL    70 g    Place 1 applicator (5 g) vaginally At Bedtime for 5 days    Bacterial vaginosis, Vaginal discharge       naproxen sodium 220 MG tablet    ANAPROX    30 tablet    Take 1 tablet (220 mg) by mouth 2 times daily (with meals)    Well woman exam with routine gynecological exam

## 2018-12-07 ENCOUNTER — RADIANT APPOINTMENT (OUTPATIENT)
Dept: MAMMOGRAPHY | Facility: CLINIC | Age: 38
End: 2018-12-07
Attending: OBSTETRICS & GYNECOLOGY
Payer: COMMERCIAL

## 2018-12-07 DIAGNOSIS — Z01.419 WELL WOMAN EXAM WITH ROUTINE GYNECOLOGICAL EXAM: ICD-10-CM

## 2018-12-10 LAB
COPATH REPORT: NORMAL
PAP: NORMAL

## 2018-12-11 LAB
FINAL DIAGNOSIS: NORMAL
HPV HR 12 DNA CVX QL NAA+PROBE: NEGATIVE
HPV16 DNA SPEC QL NAA+PROBE: NEGATIVE
HPV18 DNA SPEC QL NAA+PROBE: NEGATIVE
SPECIMEN DESCRIPTION: NORMAL
SPECIMEN SOURCE CVX/VAG CYTO: NORMAL

## 2019-09-30 ENCOUNTER — HEALTH MAINTENANCE LETTER (OUTPATIENT)
Age: 39
End: 2019-09-30

## 2019-10-10 ENCOUNTER — NURSE TRIAGE (OUTPATIENT)
Dept: NURSING | Facility: CLINIC | Age: 39
End: 2019-10-10

## 2019-10-10 NOTE — TELEPHONE ENCOUNTER
She is at work. I advised she have someone bring her to the ER for evaluation of the headache and dizziness.  Tessa Knowles RN-[facility]      Additional Information    Negative: Difficult to awaken or acting confused (e.g., disoriented, slurred speech)    Negative: Weakness of the face, arm or leg on one side of the body and new onset    Negative: Numbness of the face, arm or leg on one side of the body and new onset    Negative: Loss of speech or garbled speech and new onset    Negative: Passed out (i.e., fainted, collapsed and was not responding)    Negative: Sounds like a life-threatening emergency to the triager    Negative: Followed a head injury within last 3 days    Negative: Traumatic Brain Injury (TBI) is suspected    Negative: Sinus pain of forehead and yellow or green nasal discharge    Negative: Pregnant    Negative: Unable to walk without falling    Negative: Stiff neck (can't touch chin to chest)    Negative: Possibility of carbon monoxide exposure    SEVERE headache, states 'worst headache' of life     Pain at 6    Protocols used: HEADACHE-A-OH

## 2020-03-15 ENCOUNTER — HEALTH MAINTENANCE LETTER (OUTPATIENT)
Age: 40
End: 2020-03-15

## 2021-01-15 ENCOUNTER — HEALTH MAINTENANCE LETTER (OUTPATIENT)
Age: 41
End: 2021-01-15

## 2021-05-09 ENCOUNTER — HEALTH MAINTENANCE LETTER (OUTPATIENT)
Age: 41
End: 2021-05-09

## 2021-10-24 ENCOUNTER — HEALTH MAINTENANCE LETTER (OUTPATIENT)
Age: 41
End: 2021-10-24

## 2022-08-16 ENCOUNTER — LAB REQUISITION (OUTPATIENT)
Dept: LAB | Facility: CLINIC | Age: 42
End: 2022-08-16

## 2022-08-16 DIAGNOSIS — Z13.220 ENCOUNTER FOR SCREENING FOR LIPOID DISORDERS: ICD-10-CM

## 2022-08-16 DIAGNOSIS — Z13.1 ENCOUNTER FOR SCREENING FOR DIABETES MELLITUS: ICD-10-CM

## 2022-08-16 LAB
CHOLEST SERPL-MCNC: 186 MG/DL
HBA1C MFR BLD: 5.4 %
HDLC SERPL-MCNC: 50 MG/DL
LDLC SERPL CALC-MCNC: 122 MG/DL
NONHDLC SERPL-MCNC: 136 MG/DL
TRIGL SERPL-MCNC: 70 MG/DL

## 2022-08-16 PROCEDURE — 83036 HEMOGLOBIN GLYCOSYLATED A1C: CPT | Performed by: OBSTETRICS & GYNECOLOGY

## 2022-08-16 PROCEDURE — 80061 LIPID PANEL: CPT | Performed by: OBSTETRICS & GYNECOLOGY

## 2022-10-15 ENCOUNTER — HEALTH MAINTENANCE LETTER (OUTPATIENT)
Age: 42
End: 2022-10-15

## 2023-09-06 ENCOUNTER — LAB REQUISITION (OUTPATIENT)
Dept: LAB | Facility: CLINIC | Age: 43
End: 2023-09-06

## 2023-09-06 DIAGNOSIS — Z13.220 ENCOUNTER FOR SCREENING FOR LIPOID DISORDERS: ICD-10-CM

## 2023-09-06 DIAGNOSIS — Z13.1 ENCOUNTER FOR SCREENING FOR DIABETES MELLITUS: ICD-10-CM

## 2023-09-06 DIAGNOSIS — Z11.3 ENCOUNTER FOR SCREENING FOR INFECTIONS WITH A PREDOMINANTLY SEXUAL MODE OF TRANSMISSION: ICD-10-CM

## 2023-09-06 LAB — HOLD SPECIMEN: NORMAL

## 2023-09-06 PROCEDURE — 86780 TREPONEMA PALLIDUM: CPT | Performed by: OBSTETRICS & GYNECOLOGY

## 2023-09-06 PROCEDURE — 83036 HEMOGLOBIN GLYCOSYLATED A1C: CPT | Performed by: OBSTETRICS & GYNECOLOGY

## 2023-09-06 PROCEDURE — 87389 HIV-1 AG W/HIV-1&-2 AB AG IA: CPT | Performed by: OBSTETRICS & GYNECOLOGY

## 2023-09-06 PROCEDURE — 86803 HEPATITIS C AB TEST: CPT | Performed by: OBSTETRICS & GYNECOLOGY

## 2023-09-06 PROCEDURE — 80061 LIPID PANEL: CPT | Performed by: OBSTETRICS & GYNECOLOGY

## 2023-09-06 PROCEDURE — 87340 HEPATITIS B SURFACE AG IA: CPT | Performed by: OBSTETRICS & GYNECOLOGY

## 2023-09-07 LAB
CHOLEST SERPL-MCNC: 211 MG/DL
HBA1C MFR BLD: 5.9 %
HBV SURFACE AG SERPL QL IA: NONREACTIVE
HCV AB SERPL QL IA: NONREACTIVE
HDLC SERPL-MCNC: 61 MG/DL
HIV 1+2 AB+HIV1 P24 AG SERPL QL IA: NONREACTIVE
LDLC SERPL CALC-MCNC: 128 MG/DL
NONHDLC SERPL-MCNC: 150 MG/DL
T PALLIDUM AB SER QL: NONREACTIVE
TRIGL SERPL-MCNC: 110 MG/DL

## 2023-10-29 ENCOUNTER — HEALTH MAINTENANCE LETTER (OUTPATIENT)
Age: 43
End: 2023-10-29

## 2024-01-07 ENCOUNTER — HEALTH MAINTENANCE LETTER (OUTPATIENT)
Age: 44
End: 2024-01-07

## 2024-09-12 ENCOUNTER — LAB REQUISITION (OUTPATIENT)
Dept: LAB | Facility: CLINIC | Age: 44
End: 2024-09-12
Payer: COMMERCIAL

## 2024-09-12 DIAGNOSIS — Z13.220 ENCOUNTER FOR SCREENING FOR LIPOID DISORDERS: ICD-10-CM

## 2024-09-12 DIAGNOSIS — Z11.3 ENCOUNTER FOR SCREENING FOR INFECTIONS WITH A PREDOMINANTLY SEXUAL MODE OF TRANSMISSION: ICD-10-CM

## 2024-09-12 DIAGNOSIS — Z13.1 ENCOUNTER FOR SCREENING FOR DIABETES MELLITUS: ICD-10-CM

## 2024-09-12 DIAGNOSIS — A49.9 BACTERIAL INFECTION, UNSPECIFIED: ICD-10-CM

## 2024-09-12 DIAGNOSIS — Z13.29 ENCOUNTER FOR SCREENING FOR OTHER SUSPECTED ENDOCRINE DISORDER: ICD-10-CM

## 2024-09-12 LAB
HBA1C MFR BLD: 5.7 %
HBV SURFACE AG SERPL QL IA: NONREACTIVE
HCV AB SERPL QL IA: NONREACTIVE
HIV 1+2 AB+HIV1 P24 AG SERPL QL IA: NONREACTIVE

## 2024-09-12 PROCEDURE — 87340 HEPATITIS B SURFACE AG IA: CPT | Mod: ORL | Performed by: PHYSICIAN ASSISTANT

## 2024-09-12 PROCEDURE — 84443 ASSAY THYROID STIM HORMONE: CPT | Mod: ORL | Performed by: PHYSICIAN ASSISTANT

## 2024-09-12 PROCEDURE — 83036 HEMOGLOBIN GLYCOSYLATED A1C: CPT | Mod: ORL | Performed by: PHYSICIAN ASSISTANT

## 2024-09-12 PROCEDURE — 87798 DETECT AGENT NOS DNA AMP: CPT | Mod: ORL | Performed by: PHYSICIAN ASSISTANT

## 2024-09-12 PROCEDURE — 80061 LIPID PANEL: CPT | Mod: ORL | Performed by: PHYSICIAN ASSISTANT

## 2024-09-12 PROCEDURE — 86780 TREPONEMA PALLIDUM: CPT | Mod: ORL | Performed by: PHYSICIAN ASSISTANT

## 2024-09-12 PROCEDURE — 87389 HIV-1 AG W/HIV-1&-2 AB AG IA: CPT | Mod: ORL | Performed by: PHYSICIAN ASSISTANT

## 2024-09-12 PROCEDURE — 86803 HEPATITIS C AB TEST: CPT | Mod: ORL | Performed by: PHYSICIAN ASSISTANT

## 2024-09-13 LAB
CHOLEST SERPL-MCNC: 217 MG/DL
FASTING STATUS PATIENT QL REPORTED: YES
HDLC SERPL-MCNC: 65 MG/DL
LDLC SERPL CALC-MCNC: 139 MG/DL
NONHDLC SERPL-MCNC: 152 MG/DL
T PALLIDUM AB SER QL: NONREACTIVE
TRIGL SERPL-MCNC: 66 MG/DL
TSH SERPL DL<=0.005 MIU/L-ACNC: 2.71 UIU/ML (ref 0.3–4.2)

## 2024-09-16 LAB
M GENITALIUM DNA SPEC QL NAA+PROBE: NOT DETECTED
M HOMINIS DNA SPEC QL NAA+PROBE: DETECTED
U PARVUM DNA SPEC QL NAA+PROBE: DETECTED
U UREALYTICUM DNA SPEC QL NAA+PROBE: NOT DETECTED

## 2025-08-13 ENCOUNTER — LAB REQUISITION (OUTPATIENT)
Dept: LAB | Facility: CLINIC | Age: 45
End: 2025-08-13

## 2025-08-13 ENCOUNTER — HOSPITAL ENCOUNTER (OUTPATIENT)
Facility: CLINIC | Age: 45
Discharge: HOME | End: 2025-08-13
Admitting: OBSTETRICS & GYNECOLOGY
Payer: COMMERCIAL

## 2025-08-13 DIAGNOSIS — Z13.1 ENCOUNTER FOR SCREENING FOR DIABETES MELLITUS: ICD-10-CM

## 2025-08-13 DIAGNOSIS — Z13.220 ENCOUNTER FOR SCREENING FOR LIPOID DISORDERS: ICD-10-CM

## 2025-08-13 DIAGNOSIS — Z11.3 ENCOUNTER FOR SCREENING FOR INFECTIONS WITH A PREDOMINANTLY SEXUAL MODE OF TRANSMISSION: ICD-10-CM

## 2025-08-13 LAB
EST. AVERAGE GLUCOSE BLD GHB EST-MCNC: 105 MG/DL
HBA1C MFR BLD: 5.3 %
HIV 1+2 AB+HIV1 P24 AG SERPL QL IA: NONREACTIVE
T PALLIDUM AB SER QL: NONREACTIVE
T VAGINALIS DNA SPEC QL NAA+PROBE: NOT DETECTED

## 2025-08-13 PROCEDURE — 86803 HEPATITIS C AB TEST: CPT | Performed by: OBSTETRICS & GYNECOLOGY

## 2025-08-13 PROCEDURE — 83036 HEMOGLOBIN GLYCOSYLATED A1C: CPT | Performed by: OBSTETRICS & GYNECOLOGY

## 2025-08-13 PROCEDURE — 87389 HIV-1 AG W/HIV-1&-2 AB AG IA: CPT | Performed by: OBSTETRICS & GYNECOLOGY

## 2025-08-13 PROCEDURE — 86780 TREPONEMA PALLIDUM: CPT | Performed by: OBSTETRICS & GYNECOLOGY

## 2025-08-13 PROCEDURE — 87661 TRICHOMONAS VAGINALIS AMPLIF: CPT | Performed by: OBSTETRICS & GYNECOLOGY

## 2025-08-13 PROCEDURE — 87340 HEPATITIS B SURFACE AG IA: CPT | Performed by: OBSTETRICS & GYNECOLOGY

## 2025-08-13 PROCEDURE — 83718 ASSAY OF LIPOPROTEIN: CPT | Performed by: OBSTETRICS & GYNECOLOGY

## 2025-08-14 LAB
CHOLEST SERPL-MCNC: 221 MG/DL
FASTING STATUS PATIENT QL REPORTED: NO
HBV SURFACE AG SERPL QL IA: NONREACTIVE
HCV AB SERPL QL IA: NONREACTIVE
HDLC SERPL-MCNC: 77 MG/DL
LDLC SERPL CALC-MCNC: 132 MG/DL
NONHDLC SERPL-MCNC: 144 MG/DL
TRIGL SERPL-MCNC: 59 MG/DL

## (undated) DEVICE — NDL 25GA 1.5" 305127

## (undated) DEVICE — ESU ELEC BLADE 6" COATED/INSULATED E1455-6

## (undated) DEVICE — PREP CHLORAPREP W/ORANGE TINT 10.5ML 260715

## (undated) DEVICE — LABEL MEDICATION SYSTEM 3303-P

## (undated) DEVICE — BRUSH SURGICAL SCRUB W/4% CHG SOL 25ML 371073

## (undated) DEVICE — LINEN GOWN X4 5410

## (undated) DEVICE — DRAPE LAP W/ARMBOARD 29410

## (undated) DEVICE — DRAPE STERI TOWEL LG 1010

## (undated) DEVICE — NDL COUNTER 20CT 31142493

## (undated) DEVICE — SU VICRYL 2-0 SH 8-27" J785G

## (undated) DEVICE — GOWN IMPERVIOUS SPECIALTY XLG/XLONG 32474

## (undated) DEVICE — GLOVE PROTEXIS POWDER FREE 7.0 ORTHOPEDIC 2D73ET70

## (undated) DEVICE — PREP CHLORAPREP 26ML TINTED ORANGE  260815

## (undated) DEVICE — GLOVE PROTEXIS POWDER FREE SMT 7.0  2D72PT70X

## (undated) DEVICE — ESU ELEC BLADE 2.75" COATED/INSULATED E1455

## (undated) DEVICE — SOL ISOPROPYL RUBBING ALCOHOL USP 70% 4OZ HDX-20 I0020

## (undated) DEVICE — PACK ENT MINOR CUSTOM ASC

## (undated) DEVICE — LINEN DRAPE 54X72" 5467

## (undated) DEVICE — SYR 03ML LL W/O NDL 309657

## (undated) DEVICE — SU MONOCRYL 3-0 PS-2 18" UND Y497G

## (undated) DEVICE — BONE WAX 2.5GM W31G

## (undated) DEVICE — SU VICRYL 1 CT-1 27" J341H

## (undated) DEVICE — DRSG AQUACEL AG 3.5X6.0" HYDROFIBER 412010

## (undated) DEVICE — DRAPE IOBAN INCISE 23X17" 6650EZ

## (undated) DEVICE — SOL WATER IRRIG 1000ML BOTTLE 2F7114

## (undated) DEVICE — SYR 10ML LL W/O NDL

## (undated) DEVICE — DRAPE BACK TABLE  44X90" 8377

## (undated) DEVICE — NDL SPINAL 18GA 3.5" 405184

## (undated) DEVICE — GLOVE PROTEXIS W/NEU-THERA 7.0  2D73TE70

## (undated) DEVICE — GLOVE PROTEXIS W/NEU-THERA 7.5  2D73TE75

## (undated) DEVICE — RX SURGIFLO HEMOSTATIC MATRIX W/THROMBIN 8ML NEXTGEN 2993

## (undated) DEVICE — DRAPE MAYO STAND 23X54 8337

## (undated) DEVICE — SUCTION MANIFOLD NEPTUNE 2 SYS 4 PORT 0702-020-000

## (undated) DEVICE — LINEN ORTHO PACK 5446

## (undated) DEVICE — TUBING IV EXT SET MICRO VOLUME MALE LL ADAPTER 36" 2N3345

## (undated) DEVICE — NDL 18GAX1.5" 305185

## (undated) DEVICE — DRSG GAUZE 4X4" 2187

## (undated) DEVICE — LINEN TOWEL PACK X5 5464

## (undated) DEVICE — GLOVE PROTEXIS BLUE W/NEU-THERA 8.0  2D73EB80

## (undated) DEVICE — DRAPE MICRO ZEISS PENTERO 120X54" G650DL

## (undated) DEVICE — SOL NACL 0.9% IRRIG 1000ML BOTTLE 2F7124

## (undated) DEVICE — SU DERMABOND ADVANCED .7ML DNX12

## (undated) RX ORDER — FENTANYL CITRATE 50 UG/ML
INJECTION, SOLUTION INTRAMUSCULAR; INTRAVENOUS
Status: DISPENSED
Start: 2018-01-22

## (undated) RX ORDER — GABAPENTIN 300 MG/1
CAPSULE ORAL
Status: DISPENSED
Start: 2018-01-22

## (undated) RX ORDER — BUPIVACAINE HYDROCHLORIDE 2.5 MG/ML
INJECTION, SOLUTION EPIDURAL; INFILTRATION; INTRACAUDAL
Status: DISPENSED
Start: 2018-01-22

## (undated) RX ORDER — HYDROMORPHONE HYDROCHLORIDE 1 MG/ML
INJECTION, SOLUTION INTRAMUSCULAR; INTRAVENOUS; SUBCUTANEOUS
Status: DISPENSED
Start: 2018-01-22

## (undated) RX ORDER — KETAMINE HYDROCHLORIDE 10 MG/ML
INJECTION, SOLUTION INTRAMUSCULAR; INTRAVENOUS
Status: DISPENSED
Start: 2018-01-22

## (undated) RX ORDER — ACETAMINOPHEN 325 MG/1
TABLET ORAL
Status: DISPENSED
Start: 2018-01-22

## (undated) RX ORDER — CLINDAMYCIN PHOSPHATE 900 MG/50ML
INJECTION, SOLUTION INTRAVENOUS
Status: DISPENSED
Start: 2018-01-22